# Patient Record
Sex: MALE | Race: BLACK OR AFRICAN AMERICAN | NOT HISPANIC OR LATINO | URBAN - METROPOLITAN AREA
[De-identification: names, ages, dates, MRNs, and addresses within clinical notes are randomized per-mention and may not be internally consistent; named-entity substitution may affect disease eponyms.]

---

## 2020-06-18 ENCOUNTER — OUTPATIENT (OUTPATIENT)
Dept: OUTPATIENT SERVICES | Facility: HOSPITAL | Age: 70
LOS: 1 days | Discharge: HOME | End: 2020-06-18

## 2020-06-22 DIAGNOSIS — F11.20 OPIOID DEPENDENCE, UNCOMPLICATED: ICD-10-CM

## 2020-11-13 VITALS
HEART RATE: 74 BPM | DIASTOLIC BLOOD PRESSURE: 64 MMHG | OXYGEN SATURATION: 98 % | WEIGHT: 162.04 LBS | HEIGHT: 68 IN | TEMPERATURE: 98 F | RESPIRATION RATE: 16 BRPM | SYSTOLIC BLOOD PRESSURE: 135 MMHG

## 2020-11-13 NOTE — H&P ADULT - NSICDXPASTMEDICALHX_GEN_ALL_CORE_FT
PAST MEDICAL HISTORY:  Hypertension      PAST MEDICAL HISTORY:  BPH (benign prostatic hyperplasia)     Depression     H/O gastroesophageal reflux (GERD)     Hypertension     Prediabetes

## 2020-11-13 NOTE — H&P ADULT - NSHPLABSRESULTS_GEN_ALL_CORE
LABS:                       13.4   8.04  )-----------( 294      ( 17 Nov 2020 08:55 )             41.2       11-17    136  |  98  |  17  ----------------------------<  114<H>  4.2   |  26  |  0.75    Ca    9.5      17 Nov 2020 08:55    TPro  7.4  /  Alb  4.4  /  TBili  0.3  /  DBili  x   /  AST  24  /  ALT  17  /  AlkPhos  103  11-17      PT/INR - ( 17 Nov 2020 08:55 )   PT: 12.6 sec;   INR: 1.05          PTT - ( 17 Nov 2020 08:55 )  PTT:27.4 sec    CARDIAC MARKERS ( 17 Nov 2020 08:55 )  x     / x     / 95 U/L / x     / 1.0 ng/mL      EKG: NSR 70bpm, no acute ischemic changes

## 2020-11-13 NOTE — H&P ADULT - NSICDXFAMILYHX_GEN_ALL_CORE_FT
FAMILY HISTORY:  FH: heart attack, mother in 70s  FH: kidney failure, mother  FH: type 2 diabetes, mother

## 2020-11-13 NOTE — H&P ADULT - HISTORY OF PRESENT ILLNESS
****SKELETON****  COVID PCR ***  Cardiologist: Dr. Tristin Mcneil   Pharmacy:   Escort:     69 y/o male, former heroin user ***, w/ PMHx HTN, COPD (**intubations/hospitalizations), BPH, borderline type 2 DM, GERD, anxiety, hepatitis C (s/p treatment), cervical radiculopathy, chronic low back pain, vitamin D deficiency, and hypomagnesemia who presented to outpatient cardiologist, Dr. Tristin Mcneil, c/o *****. Patient reports ***. Patient denies ****. NST (09/10/2020) showed a small area of anterior and apical wall ischemia, EF 61%.     In light of patient's risk factors, CCS Class *** anginal symptoms, and abnormal NST results, patient is referred for cardiac catheterization w/ possible intervention if clinically indicated.  ****SKELETON****  COVID PCR ***  Cardiologist: Dr. Tristin Mcneil   Pharmacy:   Escort:     69 y/o male, former heroin user ***, w/ PMHx HTN, COPD (**intubations/hospitalizations), BPH, borderline type 2 DM, GERD, anxiety, hepatitis C (s/p treatment), cervical radiculopathy, chronic low back pain, who presented to outpatient cardiologist, Dr. Tristin Mcneil, c/o *****. Patient reports ***. Patient denies ****. NST (09/10/2020) showed a small area of anterior and apical wall ischemia, EF 61%, and stress EKG revealed new TWI V4 and V5.   In light of patient's risk factors, CCS Class *** anginal symptoms, and abnormal NST results, patient is referred for cardiac catheterization w/ possible intervention if clinically indicated.  COVID PCR negative 11/15  Cardiologist: Dr. 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 Tristin Mcneil, c/o worsening SOB with exertion. Pt states over the past few months, he has noticed feeling SOB/winded after walking up a flight of stairs. He denies CP, dizziness, palpitations, LOC, orthopnea/PND, leg swelling. No recent illness, fever, cough, or sick contacts. He subsequently underwent pharm NST (09/10/2020) revealing a small area of anterior and apical wall ischemia, EF 61%, and stress EKG revealed new TWI V4 and V5.     In light of patient's risk factors, CCS Class III anginal equivalent symptoms, and abnormal NST, patient is referred for cardiac catheterization w/ possible intervention if clinically indicated.

## 2020-11-13 NOTE — H&P ADULT - NSHPSOCIALHISTORY_GEN_ALL_CORE
-former smoker (1PPD x30 years, quit 20 years ago), former heroin user (quit 20 years ago), no current ETOH or illicit drugs  -lives with wife

## 2020-11-13 NOTE — H&P ADULT - ASSESSMENT
69 y/o male, former 30py smoker and former heroin user (20 yrs ago, on methadone), FHx MI (mother, 70s) with PMHx HTN, prediabetes, COPD (no intubations/hospitalizations), BPH, GERD, depression/anxiety, hepatitis C (s/p treatment), cervical radiculopathy, chronic low back pain, hypomagnesemia, who presents for cardiac catheterization in light of patient's risk factors, CCS Class III anginal equivalent symptoms, and abnormal NST    -ASA III, Mallampati III  -suitable candidate for moderate sedation  -pt reports compliance with aspirin 81mg daily, last dose taken this AM (11/17). Load plavix 600mg   -NS 75cc/hr  -cath consent obtained    Risks & benefits of procedure and alternative therapy have been explained to the patient including but not limited to: allergic reaction, bleeding w/possible need for blood transfusion, infection, renal and vascular compromise, limb damage, arrhythmia, stroke, vessel dissection/perforation, Myocardial infarction, emergent CABG.

## 2020-11-15 ENCOUNTER — APPOINTMENT (OUTPATIENT)
Dept: DISASTER EMERGENCY | Facility: CLINIC | Age: 70
End: 2020-11-15

## 2020-11-15 ENCOUNTER — LABORATORY RESULT (OUTPATIENT)
Age: 70
End: 2020-11-15

## 2020-11-17 ENCOUNTER — OUTPATIENT (OUTPATIENT)
Dept: OUTPATIENT SERVICES | Facility: HOSPITAL | Age: 70
LOS: 1 days | Discharge: NURSING, REHAB OR LONG-TERM CARE | End: 2020-11-17
Payer: COMMERCIAL

## 2020-11-17 LAB
ALBUMIN SERPL ELPH-MCNC: 4.4 G/DL — SIGNIFICANT CHANGE UP (ref 3.3–5)
ALP SERPL-CCNC: 103 U/L — SIGNIFICANT CHANGE UP (ref 40–120)
ALT FLD-CCNC: 17 U/L — SIGNIFICANT CHANGE UP (ref 10–45)
ANION GAP SERPL CALC-SCNC: 12 MMOL/L — SIGNIFICANT CHANGE UP (ref 5–17)
APTT BLD: 27.4 SEC — LOW (ref 27.5–35.5)
AST SERPL-CCNC: 24 U/L — SIGNIFICANT CHANGE UP (ref 10–40)
BASOPHILS # BLD AUTO: 0.06 K/UL — SIGNIFICANT CHANGE UP (ref 0–0.2)
BASOPHILS NFR BLD AUTO: 0.7 % — SIGNIFICANT CHANGE UP (ref 0–2)
BILIRUB SERPL-MCNC: 0.3 MG/DL — SIGNIFICANT CHANGE UP (ref 0.2–1.2)
BUN SERPL-MCNC: 17 MG/DL — SIGNIFICANT CHANGE UP (ref 7–23)
CALCIUM SERPL-MCNC: 9.5 MG/DL — SIGNIFICANT CHANGE UP (ref 8.4–10.5)
CHLORIDE SERPL-SCNC: 98 MMOL/L — SIGNIFICANT CHANGE UP (ref 96–108)
CHOLEST SERPL-MCNC: 153 MG/DL — SIGNIFICANT CHANGE UP
CK MB CFR SERPL CALC: 1 NG/ML — SIGNIFICANT CHANGE UP (ref 0–6.7)
CK SERPL-CCNC: 95 U/L — SIGNIFICANT CHANGE UP (ref 30–200)
CO2 SERPL-SCNC: 26 MMOL/L — SIGNIFICANT CHANGE UP (ref 22–31)
CREAT SERPL-MCNC: 0.75 MG/DL — SIGNIFICANT CHANGE UP (ref 0.5–1.3)
EOSINOPHIL # BLD AUTO: 0.08 K/UL — SIGNIFICANT CHANGE UP (ref 0–0.5)
EOSINOPHIL NFR BLD AUTO: 1 % — SIGNIFICANT CHANGE UP (ref 0–6)
GLUCOSE SERPL-MCNC: 114 MG/DL — HIGH (ref 70–99)
HCT VFR BLD CALC: 41.2 % — SIGNIFICANT CHANGE UP (ref 39–50)
HCV AB S/CO SERPL IA: 11.1 S/CO — SIGNIFICANT CHANGE UP
HCV AB SERPL-IMP: REACTIVE
HDLC SERPL-MCNC: 95 MG/DL — SIGNIFICANT CHANGE UP
HGB BLD-MCNC: 13.4 G/DL — SIGNIFICANT CHANGE UP (ref 13–17)
IMM GRANULOCYTES NFR BLD AUTO: 0.2 % — SIGNIFICANT CHANGE UP (ref 0–1.5)
INR BLD: 1.05 — SIGNIFICANT CHANGE UP (ref 0.88–1.16)
LIPID PNL WITH DIRECT LDL SERPL: 49 MG/DL — SIGNIFICANT CHANGE UP
LYMPHOCYTES # BLD AUTO: 1.81 K/UL — SIGNIFICANT CHANGE UP (ref 1–3.3)
LYMPHOCYTES # BLD AUTO: 22.5 % — SIGNIFICANT CHANGE UP (ref 13–44)
MAGNESIUM SERPL-MCNC: 2.3 MG/DL — SIGNIFICANT CHANGE UP (ref 1.6–2.6)
MCHC RBC-ENTMCNC: 27 PG — SIGNIFICANT CHANGE UP (ref 27–34)
MCHC RBC-ENTMCNC: 32.5 GM/DL — SIGNIFICANT CHANGE UP (ref 32–36)
MCV RBC AUTO: 83.1 FL — SIGNIFICANT CHANGE UP (ref 80–100)
MONOCYTES # BLD AUTO: 0.69 K/UL — SIGNIFICANT CHANGE UP (ref 0–0.9)
MONOCYTES NFR BLD AUTO: 8.6 % — SIGNIFICANT CHANGE UP (ref 2–14)
NEUTROPHILS # BLD AUTO: 5.38 K/UL — SIGNIFICANT CHANGE UP (ref 1.8–7.4)
NEUTROPHILS NFR BLD AUTO: 67 % — SIGNIFICANT CHANGE UP (ref 43–77)
NON HDL CHOLESTEROL: 58 MG/DL — SIGNIFICANT CHANGE UP
NRBC # BLD: 0 /100 WBCS — SIGNIFICANT CHANGE UP (ref 0–0)
PLATELET # BLD AUTO: 294 K/UL — SIGNIFICANT CHANGE UP (ref 150–400)
POTASSIUM SERPL-MCNC: 4.2 MMOL/L — SIGNIFICANT CHANGE UP (ref 3.5–5.3)
POTASSIUM SERPL-SCNC: 4.2 MMOL/L — SIGNIFICANT CHANGE UP (ref 3.5–5.3)
PROT SERPL-MCNC: 7.4 G/DL — SIGNIFICANT CHANGE UP (ref 6–8.3)
PROTHROM AB SERPL-ACNC: 12.6 SEC — SIGNIFICANT CHANGE UP (ref 10.6–13.6)
RBC # BLD: 4.96 M/UL — SIGNIFICANT CHANGE UP (ref 4.2–5.8)
RBC # FLD: 13.9 % — SIGNIFICANT CHANGE UP (ref 10.3–14.5)
SODIUM SERPL-SCNC: 136 MMOL/L — SIGNIFICANT CHANGE UP (ref 135–145)
TRIGL SERPL-MCNC: 44 MG/DL — SIGNIFICANT CHANGE UP
WBC # BLD: 8.04 K/UL — SIGNIFICANT CHANGE UP (ref 3.8–10.5)
WBC # FLD AUTO: 8.04 K/UL — SIGNIFICANT CHANGE UP (ref 3.8–10.5)

## 2020-11-17 PROCEDURE — 85610 PROTHROMBIN TIME: CPT

## 2020-11-17 PROCEDURE — 82550 ASSAY OF CK (CPK): CPT

## 2020-11-17 PROCEDURE — C1887: CPT

## 2020-11-17 PROCEDURE — C1894: CPT

## 2020-11-17 PROCEDURE — 82553 CREATINE MB FRACTION: CPT

## 2020-11-17 PROCEDURE — 93458 L HRT ARTERY/VENTRICLE ANGIO: CPT | Mod: 26

## 2020-11-17 PROCEDURE — 80061 LIPID PANEL: CPT

## 2020-11-17 PROCEDURE — 93458 L HRT ARTERY/VENTRICLE ANGIO: CPT

## 2020-11-17 PROCEDURE — 87521 HEPATITIS C PROBE&RVRS TRNSC: CPT

## 2020-11-17 PROCEDURE — 85730 THROMBOPLASTIN TIME PARTIAL: CPT

## 2020-11-17 PROCEDURE — 36415 COLL VENOUS BLD VENIPUNCTURE: CPT

## 2020-11-17 PROCEDURE — C1769: CPT

## 2020-11-17 PROCEDURE — 83735 ASSAY OF MAGNESIUM: CPT

## 2020-11-17 PROCEDURE — 85025 COMPLETE CBC W/AUTO DIFF WBC: CPT

## 2020-11-17 PROCEDURE — 80053 COMPREHEN METABOLIC PANEL: CPT

## 2020-11-17 PROCEDURE — 86803 HEPATITIS C AB TEST: CPT

## 2020-11-17 RX ORDER — PANTOPRAZOLE SODIUM 20 MG/1
1 TABLET, DELAYED RELEASE ORAL
Qty: 0 | Refills: 0 | DISCHARGE

## 2020-11-17 RX ORDER — ALBUTEROL 90 UG/1
2 AEROSOL, METERED ORAL
Qty: 0 | Refills: 0 | DISCHARGE

## 2020-11-17 RX ORDER — FINASTERIDE 5 MG/1
1 TABLET, FILM COATED ORAL
Qty: 0 | Refills: 0 | DISCHARGE

## 2020-11-17 RX ORDER — LUBIPROSTONE 24 UG/1
1 CAPSULE, GELATIN COATED ORAL
Qty: 0 | Refills: 0 | DISCHARGE

## 2020-11-17 RX ORDER — SODIUM CHLORIDE 9 MG/ML
500 INJECTION INTRAMUSCULAR; INTRAVENOUS; SUBCUTANEOUS
Refills: 0 | Status: DISCONTINUED | OUTPATIENT
Start: 2020-11-17 | End: 2020-11-17

## 2020-11-17 RX ORDER — CHLORHEXIDINE GLUCONATE 213 G/1000ML
1 SOLUTION TOPICAL ONCE
Refills: 0 | Status: DISCONTINUED | OUTPATIENT
Start: 2020-11-17 | End: 2020-11-17

## 2020-11-17 RX ORDER — ARIPIPRAZOLE 15 MG/1
1 TABLET ORAL
Qty: 0 | Refills: 0 | DISCHARGE

## 2020-11-17 RX ORDER — TAMSULOSIN HYDROCHLORIDE 0.4 MG/1
2 CAPSULE ORAL
Qty: 0 | Refills: 0 | DISCHARGE

## 2020-11-17 RX ORDER — RAMIPRIL 5 MG
1 CAPSULE ORAL
Qty: 0 | Refills: 0 | DISCHARGE

## 2020-11-17 RX ORDER — MAGNESIUM OXIDE 400 MG ORAL TABLET 241.3 MG
1 TABLET ORAL
Qty: 0 | Refills: 0 | DISCHARGE

## 2020-11-17 RX ORDER — DONEPEZIL HYDROCHLORIDE 10 MG/1
1 TABLET, FILM COATED ORAL
Qty: 0 | Refills: 0 | DISCHARGE

## 2020-11-17 RX ORDER — QUETIAPINE FUMARATE 200 MG/1
1 TABLET, FILM COATED ORAL
Qty: 0 | Refills: 0 | DISCHARGE

## 2020-11-17 RX ORDER — CLOPIDOGREL BISULFATE 75 MG/1
600 TABLET, FILM COATED ORAL ONCE
Refills: 0 | Status: COMPLETED | OUTPATIENT
Start: 2020-11-17 | End: 2020-11-17

## 2020-11-17 RX ORDER — AMLODIPINE BESYLATE 2.5 MG/1
1 TABLET ORAL
Qty: 0 | Refills: 0 | DISCHARGE

## 2020-11-17 RX ORDER — BUDESONIDE AND FORMOTEROL FUMARATE DIHYDRATE 160; 4.5 UG/1; UG/1
2 AEROSOL RESPIRATORY (INHALATION)
Qty: 0 | Refills: 0 | DISCHARGE

## 2020-11-17 RX ORDER — ASPIRIN/CALCIUM CARB/MAGNESIUM 324 MG
1 TABLET ORAL
Qty: 0 | Refills: 0 | DISCHARGE

## 2020-11-17 RX ADMIN — CLOPIDOGREL BISULFATE 600 MILLIGRAM(S): 75 TABLET, FILM COATED ORAL at 09:49

## 2020-11-17 RX ADMIN — SODIUM CHLORIDE 75 MILLILITER(S): 9 INJECTION INTRAMUSCULAR; INTRAVENOUS; SUBCUTANEOUS at 09:50

## 2020-11-17 NOTE — CHART NOTE - NSCHARTNOTEFT_GEN_A_CORE
Coronary Artery Disease  Coronary Angiogram  LMCA: Normal  LAD: Mild luminal irregularities. Mid myocardial bridge  LCx: Mild luminal irregularities  RCA: Large, dominant vessel with mild luminal irregularities    Left Heart Catheterization  LV Gram: EF 65%  LVEDP 8 mmHg  No AS, No MR    No intervention.    Radial band placed on right radial access site with adequate hemostasis prior to leaving the cath lab. Go-Low study.  No procedural complications    Recommendations:  Medical management  Discharge home  Follow up with outpatient cardiologist, Dr. Mcneil

## 2020-11-17 NOTE — PROGRESS NOTE ADULT - SUBJECTIVE AND OBJECTIVE BOX
Interventional Cardiology PA SDA Discharge Note    Patient without complaints. Ambulated and voided without difficulties  Afebrile, VSS  Ext: Right Radial : no hematoma, no bleeding, dressing C/D/I  Pulses: intact RAD to baseline     A/P:                  1.	Stable for discharge as per attending  _________ after bed rest, pt voids, groin/wrist stable and 30 minutes of ambulation.  2.	Follow-up with PMD/Cardiologist ___________ in 1-2 weeks  3.	Discharged forms signed and copies in chart    Interventional Cardiology PA SDA Discharge Note    Patient without complaints. Ambulated and voided without difficulties  Afebrile, VSS  Ext: Right Radial : no hematoma, no bleeding, dressing C/D/I  Pulses: intact RAD to baseline     A/P:      69 y/o male, former 30py smoker and former heroin user (20 yrs ago, on methadone), FHx MI (mother, 70s) with PMHx HTN, prediabetes, COPD (no intubations/hospitalizations), BPH, GERD, depression/anxiety, hepatitis C (s/p treatment), cervical radiculopathy, chronic low back pain, hypomagnesemia, who presented to outpatient cardiologist, Dr. Tristin Mcneil, c/o worsening SOB with exertion. Pt states over the past few months, he has noticed feeling SOB/winded after walking up a flight of stairs. He denies CP, dizziness, palpitations, LOC, orthopnea/PND, leg swelling. No recent illness, fever, cough, or sick contacts. He subsequently underwent pharm NST (09/10/2020) revealing a small area of anterior and apical wall ischemia, EF 61%, and stress EKG revealed new TWI V4 and V5.     In light of patient's risk factors, CCS Class III anginal equivalent symptoms, and abnormal NST, patient is referred for cardiac catheterization w/ possible intervention if clinically indicated.    Pt is now s/p diagnostic cardiac catheterization (11/17/2020) w/ non-obstructive CAD and no intervention needed (full report below).     Coronary Angiogram:  - LMCA: Normal  - LAD: Mild luminal irregularities. Mid myocardial bridge  - LCx: Mild luminal irregularities  - RCA: Large, dominant vessel with mild luminal irregularities    Left Heart Catheterization  -LV Gram: EF 65%  -LVEDP 8 mmHg  -No AS, No MR      Pt's vitals stable, access site stable with successful hemostasis, pt able to ambulate, and pt able to urinate. Patient deemed stable for discharge home.       1. Stable for discharge as per attending Dr. Carlo Mcneil after bed rest, pt voids, groin/wrist stable and 30 minutes of ambulation.  2. Follow-up with Cardiologist Dr. Tristin Mcneil in 1-2 weeks  3. Discharged forms signed and copies in chart

## 2020-11-21 LAB — HCV RNA FLD QL NAA+PROBE: SIGNIFICANT CHANGE UP

## 2020-12-15 PROBLEM — Z00.00 ENCOUNTER FOR PREVENTIVE HEALTH EXAMINATION: Status: ACTIVE | Noted: 2020-12-15

## 2020-12-30 ENCOUNTER — INPATIENT (INPATIENT)
Facility: HOSPITAL | Age: 70
LOS: 6 days | Discharge: HOME HEALTH | End: 2021-01-06
Attending: INTERNAL MEDICINE | Admitting: INTERNAL MEDICINE
Payer: MEDICARE

## 2020-12-30 VITALS
SYSTOLIC BLOOD PRESSURE: 127 MMHG | WEIGHT: 160.06 LBS | OXYGEN SATURATION: 98 % | DIASTOLIC BLOOD PRESSURE: 69 MMHG | TEMPERATURE: 99 F | RESPIRATION RATE: 16 BRPM | HEART RATE: 88 BPM | HEIGHT: 68 IN

## 2020-12-30 DIAGNOSIS — I10 ESSENTIAL (PRIMARY) HYPERTENSION: ICD-10-CM

## 2020-12-30 DIAGNOSIS — E87.1 HYPO-OSMOLALITY AND HYPONATREMIA: ICD-10-CM

## 2020-12-30 DIAGNOSIS — U07.1 COVID-19: ICD-10-CM

## 2020-12-30 DIAGNOSIS — Z79.51 LONG TERM (CURRENT) USE OF INHALED STEROIDS: ICD-10-CM

## 2020-12-30 DIAGNOSIS — J12.82 PNEUMONIA DUE TO CORONAVIRUS DISEASE 2019: ICD-10-CM

## 2020-12-30 DIAGNOSIS — F11.10 OPIOID ABUSE, UNCOMPLICATED: ICD-10-CM

## 2020-12-30 DIAGNOSIS — J18.9 PNEUMONIA, UNSPECIFIED ORGANISM: ICD-10-CM

## 2020-12-30 DIAGNOSIS — N40.0 BENIGN PROSTATIC HYPERPLASIA WITHOUT LOWER URINARY TRACT SYMPTOMS: ICD-10-CM

## 2020-12-30 DIAGNOSIS — Z79.82 LONG TERM (CURRENT) USE OF ASPIRIN: ICD-10-CM

## 2020-12-30 DIAGNOSIS — I25.10 ATHEROSCLEROTIC HEART DISEASE OF NATIVE CORONARY ARTERY WITHOUT ANGINA PECTORIS: ICD-10-CM

## 2020-12-30 DIAGNOSIS — J44.0 CHRONIC OBSTRUCTIVE PULMONARY DISEASE WITH (ACUTE) LOWER RESPIRATORY INFECTION: ICD-10-CM

## 2020-12-30 DIAGNOSIS — R09.02 HYPOXEMIA: ICD-10-CM

## 2020-12-30 DIAGNOSIS — F41.8 OTHER SPECIFIED ANXIETY DISORDERS: ICD-10-CM

## 2020-12-30 DIAGNOSIS — R73.03 PREDIABETES: ICD-10-CM

## 2020-12-30 DIAGNOSIS — Z87.891 PERSONAL HISTORY OF NICOTINE DEPENDENCE: ICD-10-CM

## 2020-12-30 DIAGNOSIS — K21.9 GASTRO-ESOPHAGEAL REFLUX DISEASE WITHOUT ESOPHAGITIS: ICD-10-CM

## 2020-12-30 PROBLEM — F32.9 MAJOR DEPRESSIVE DISORDER, SINGLE EPISODE, UNSPECIFIED: Chronic | Status: ACTIVE | Noted: 2020-11-17

## 2020-12-30 PROBLEM — Z87.19 PERSONAL HISTORY OF OTHER DISEASES OF THE DIGESTIVE SYSTEM: Chronic | Status: ACTIVE | Noted: 2020-11-17

## 2020-12-30 LAB
ALBUMIN SERPL ELPH-MCNC: 3.6 G/DL — SIGNIFICANT CHANGE UP (ref 3.3–5)
ALP SERPL-CCNC: 95 U/L — SIGNIFICANT CHANGE UP (ref 40–120)
ALT FLD-CCNC: 34 U/L — SIGNIFICANT CHANGE UP (ref 12–78)
ANION GAP SERPL CALC-SCNC: 9 MMOL/L — SIGNIFICANT CHANGE UP (ref 5–17)
APPEARANCE UR: CLEAR — SIGNIFICANT CHANGE UP
AST SERPL-CCNC: 93 U/L — HIGH (ref 15–37)
BACTERIA # UR AUTO: ABNORMAL
BASE EXCESS BLDA CALC-SCNC: 2.5 MMOL/L — HIGH (ref -2–2)
BASOPHILS # BLD AUTO: 0.02 K/UL — SIGNIFICANT CHANGE UP (ref 0–0.2)
BASOPHILS NFR BLD AUTO: 0.3 % — SIGNIFICANT CHANGE UP (ref 0–2)
BILIRUB SERPL-MCNC: 0.4 MG/DL — SIGNIFICANT CHANGE UP (ref 0.2–1.2)
BILIRUB UR-MCNC: NEGATIVE — SIGNIFICANT CHANGE UP
BLOOD GAS COMMENTS: SIGNIFICANT CHANGE UP
BLOOD GAS COMMENTS: SIGNIFICANT CHANGE UP
BLOOD GAS SOURCE: SIGNIFICANT CHANGE UP
BUN SERPL-MCNC: 14 MG/DL — SIGNIFICANT CHANGE UP (ref 7–23)
CALCIUM SERPL-MCNC: 8.3 MG/DL — LOW (ref 8.5–10.1)
CHLORIDE SERPL-SCNC: 97 MMOL/L — SIGNIFICANT CHANGE UP (ref 96–108)
CO2 SERPL-SCNC: 27 MMOL/L — SIGNIFICANT CHANGE UP (ref 22–31)
COLOR SPEC: YELLOW — SIGNIFICANT CHANGE UP
CREAT SERPL-MCNC: 0.95 MG/DL — SIGNIFICANT CHANGE UP (ref 0.5–1.3)
DIFF PNL FLD: ABNORMAL
EOSINOPHIL # BLD AUTO: 0 K/UL — SIGNIFICANT CHANGE UP (ref 0–0.5)
EOSINOPHIL NFR BLD AUTO: 0 % — SIGNIFICANT CHANGE UP (ref 0–6)
EPI CELLS # UR: SIGNIFICANT CHANGE UP
GLUCOSE BLDC GLUCOMTR-MCNC: 130 MG/DL — HIGH (ref 70–99)
GLUCOSE SERPL-MCNC: 132 MG/DL — HIGH (ref 70–99)
GLUCOSE UR QL: NEGATIVE MG/DL — SIGNIFICANT CHANGE UP
HCO3 BLDA-SCNC: 26 MMOL/L — SIGNIFICANT CHANGE UP (ref 21–29)
HCT VFR BLD CALC: 41.1 % — SIGNIFICANT CHANGE UP (ref 39–50)
HGB BLD-MCNC: 13.5 G/DL — SIGNIFICANT CHANGE UP (ref 13–17)
HOROWITZ INDEX BLDA+IHG-RTO: 45 — SIGNIFICANT CHANGE UP
IMM GRANULOCYTES NFR BLD AUTO: 0.3 % — SIGNIFICANT CHANGE UP (ref 0–1.5)
KETONES UR-MCNC: NEGATIVE — SIGNIFICANT CHANGE UP
LEUKOCYTE ESTERASE UR-ACNC: NEGATIVE — SIGNIFICANT CHANGE UP
LYMPHOCYTES # BLD AUTO: 1.6 K/UL — SIGNIFICANT CHANGE UP (ref 1–3.3)
LYMPHOCYTES # BLD AUTO: 22.5 % — SIGNIFICANT CHANGE UP (ref 13–44)
MAGNESIUM SERPL-MCNC: 2.1 MG/DL — SIGNIFICANT CHANGE UP (ref 1.6–2.6)
MCHC RBC-ENTMCNC: 26.6 PG — LOW (ref 27–34)
MCHC RBC-ENTMCNC: 32.8 GM/DL — SIGNIFICANT CHANGE UP (ref 32–36)
MCV RBC AUTO: 80.9 FL — SIGNIFICANT CHANGE UP (ref 80–100)
MONOCYTES # BLD AUTO: 0.27 K/UL — SIGNIFICANT CHANGE UP (ref 0–0.9)
MONOCYTES NFR BLD AUTO: 3.8 % — SIGNIFICANT CHANGE UP (ref 2–14)
NEUTROPHILS # BLD AUTO: 5.2 K/UL — SIGNIFICANT CHANGE UP (ref 1.8–7.4)
NEUTROPHILS NFR BLD AUTO: 73.1 % — SIGNIFICANT CHANGE UP (ref 43–77)
NITRITE UR-MCNC: NEGATIVE — SIGNIFICANT CHANGE UP
NRBC # BLD: 0 /100 WBCS — SIGNIFICANT CHANGE UP (ref 0–0)
NT-PROBNP SERPL-SCNC: 213 PG/ML — HIGH (ref 0–125)
PCO2 BLDA: 37 MMHG — SIGNIFICANT CHANGE UP (ref 32–46)
PH BLD: 7.46 — HIGH (ref 7.35–7.45)
PH UR: 5 — SIGNIFICANT CHANGE UP (ref 5–8)
PHOSPHATE SERPL-MCNC: 3.6 MG/DL — SIGNIFICANT CHANGE UP (ref 2.5–4.5)
PLATELET # BLD AUTO: 248 K/UL — SIGNIFICANT CHANGE UP (ref 150–400)
PO2 BLDA: 65 MMHG — LOW (ref 74–108)
POTASSIUM SERPL-MCNC: 3.7 MMOL/L — SIGNIFICANT CHANGE UP (ref 3.5–5.3)
POTASSIUM SERPL-SCNC: 3.7 MMOL/L — SIGNIFICANT CHANGE UP (ref 3.5–5.3)
PROT SERPL-MCNC: 8.1 GM/DL — SIGNIFICANT CHANGE UP (ref 6–8.3)
PROT UR-MCNC: 15 MG/DL
RBC # BLD: 5.08 M/UL — SIGNIFICANT CHANGE UP (ref 4.2–5.8)
RBC # FLD: 13.5 % — SIGNIFICANT CHANGE UP (ref 10.3–14.5)
RBC CASTS # UR COMP ASSIST: SIGNIFICANT CHANGE UP /HPF (ref 0–4)
SAO2 % BLDA: 92 % — SIGNIFICANT CHANGE UP (ref 92–96)
SODIUM SERPL-SCNC: 133 MMOL/L — LOW (ref 135–145)
SP GR SPEC: 1.01 — SIGNIFICANT CHANGE UP (ref 1.01–1.02)
TROPONIN I SERPL-MCNC: <.015 NG/ML — SIGNIFICANT CHANGE UP (ref 0.01–0.04)
TROPONIN I SERPL-MCNC: <.015 NG/ML — SIGNIFICANT CHANGE UP (ref 0.01–0.04)
UROBILINOGEN FLD QL: 1 MG/DL
WBC # BLD: 7.11 K/UL — SIGNIFICANT CHANGE UP (ref 3.8–10.5)
WBC # FLD AUTO: 7.11 K/UL — SIGNIFICANT CHANGE UP (ref 3.8–10.5)

## 2020-12-30 PROCEDURE — 93010 ELECTROCARDIOGRAM REPORT: CPT

## 2020-12-30 PROCEDURE — 99285 EMERGENCY DEPT VISIT HI MDM: CPT

## 2020-12-30 PROCEDURE — 71045 X-RAY EXAM CHEST 1 VIEW: CPT | Mod: 26

## 2020-12-30 PROCEDURE — 71275 CT ANGIOGRAPHY CHEST: CPT | Mod: 26,MA

## 2020-12-30 RX ORDER — SODIUM CHLORIDE 9 MG/ML
500 INJECTION INTRAMUSCULAR; INTRAVENOUS; SUBCUTANEOUS ONCE
Refills: 0 | Status: COMPLETED | OUTPATIENT
Start: 2020-12-30 | End: 2020-12-30

## 2020-12-30 RX ORDER — AZITHROMYCIN 500 MG/1
500 TABLET, FILM COATED ORAL ONCE
Refills: 0 | Status: COMPLETED | OUTPATIENT
Start: 2020-12-30 | End: 2020-12-30

## 2020-12-30 RX ORDER — ONDANSETRON 8 MG/1
4 TABLET, FILM COATED ORAL ONCE
Refills: 0 | Status: COMPLETED | OUTPATIENT
Start: 2020-12-30 | End: 2020-12-30

## 2020-12-30 RX ORDER — CEFTRIAXONE 500 MG/1
1000 INJECTION, POWDER, FOR SOLUTION INTRAMUSCULAR; INTRAVENOUS ONCE
Refills: 0 | Status: COMPLETED | OUTPATIENT
Start: 2020-12-30 | End: 2020-12-30

## 2020-12-30 RX ADMIN — ONDANSETRON 4 MILLIGRAM(S): 8 TABLET, FILM COATED ORAL at 16:54

## 2020-12-30 RX ADMIN — AZITHROMYCIN 255 MILLIGRAM(S): 500 TABLET, FILM COATED ORAL at 22:23

## 2020-12-30 RX ADMIN — SODIUM CHLORIDE 500 MILLILITER(S): 9 INJECTION INTRAMUSCULAR; INTRAVENOUS; SUBCUTANEOUS at 15:40

## 2020-12-30 NOTE — ED PROVIDER NOTE - PATIENT PORTAL LINK FT
You can access the FollowMyHealth Patient Portal offered by [facility] by registering at the following website: [email or web address]. By joining [employer]’s FollowMyHealth portal, you will also be able to view your health information using other applications (apps) compatible with our system.

## 2020-12-30 NOTE — ED PROVIDER NOTE - PMH
BPH (benign prostatic hyperplasia)    Depression    H/O gastroesophageal reflux (GERD)    Hypertension    Prediabetes

## 2020-12-30 NOTE — ED PROVIDER NOTE - NSFOLLOWUPCLINICS_GEN_ALL_ED_FT
[facility]  Psychiatry  [id number] [place] [address]  Phone: [phone]  Fax:   Follow Up Time:

## 2020-12-30 NOTE — ED PROVIDER NOTE - PROGRESS NOTE DETAILS
Phuc: tolerating PO, trop x 2 negative, dc with psych follow up. Patient poor historian, was treated by Dr. Friend as above, normal labs and vitals in ER, however post discharge patient hypoxic to low 80's at rest.  Patient denies dyspnea, no wheezing on exam, COPD at home, denies use of oxygen.  Patient's discharge undone, will obtain ABG, PE study, pro BNP, admit. COVID negative.    Gen: Alert, NAD, well appearing  Head: NC, AT, PERRL, EOMI, normal lids/conjunctiva  ENT: normal hearing, patent oropharynx without erythema/exudate, uvula midline  Neck: +supple, no tenderness/meningismus/JVD, +Trachea midline  Pulm: Bilateral BS, slightly increased resp effort, no wheeze/stridor/retractions  CV: tachycardia, no M/R/G, +dist pulses  Abd: soft, NT/ND, Negative [place] signs, +BS, no palpable masses  Mskel: no edema/erythema/cyanosis  Skin: no rash, warm/dry  Neuro: AAOx3, no apparent sensory/motor deficits, coordination intact Patient poor historian, was treated by Dr. Friend as above, normal labs and vitals in ER, however post discharge patient hypoxic to low 80's at rest.  Patient denies dyspnea, no wheezing on exam, COPD at home, denies use of oxygen.  Patient's discharge undone, will obtain ABG, PE study, pro BNP, admit. COVID pending, CXR read + for BL pneumonia, will cover with rocephin/azithro for CAP.     Gen: Alert, NAD, well appearing  Head: NC, AT, PERRL, EOMI, normal lids/conjunctiva  ENT: normal hearing, patent oropharynx without erythema/exudate, uvula midline  Neck: +supple, no tenderness/meningismus/JVD, +Trachea midline  Pulm: Bilateral BS, slightly increased resp effort, no wheeze/stridor/retractions  CV: tachycardia, no M/R/G, +dist pulses  Abd: soft, NT/ND, Negative [place] signs, +BS, no palpable masses  Mskel: no edema/erythema/cyanosis  Skin: no rash, warm/dry  Neuro: AAOx3, no apparent sensory/motor deficits, coordination intact CTA negative for PE.  Patient is to be admitted to the hospital and the case was discussed with the admitting physician.  Any changes in plan, additional imaging/labs, and further work up will be at the discretion of the admitting physician. Per discussion with admitting physician, all necessary consults for admitted patients to be obtained by morning team unless patient requires emergent intervention or medical advice by specialist overnight. Patient poor historian, was treated by Dr. Friend as above, normal labs and vitals in ER, however post discharge patient hypoxic to low 80's at rest.  Patient denies dyspnea, no wheezing on exam, COPD at home, denies use of oxygen.  Patient's discharge undone, will obtain ABG, PE study, pro BNP, admit. Patient not complaining of abdominal pain or nausea at this time.  COVID pending, CXR read + for BL pneumonia, will cover with rocephin/azithro for CAP.     Gen: Alert, NAD, well appearing  Head: NC, AT, PERRL, EOMI, normal lids/conjunctiva  ENT: normal hearing, patent oropharynx without erythema/exudate, uvula midline  Neck: +supple, no tenderness/meningismus/JVD, +Trachea midline  Pulm: Bilateral BS, slightly increased resp effort, no wheeze/stridor/retractions  CV: tachycardia, no M/R/G, +dist pulses  Abd: soft, NT/ND, Negative [place] signs, +BS, no palpable masses  Mskel: no edema/erythema/cyanosis  Skin: no rash, warm/dry  Neuro: AAOx3, no apparent sensory/motor deficits, coordination intact

## 2020-12-30 NOTE — ED PROVIDER NOTE - PHYSICAL EXAMINATION
VITALS: reviewed  GEN: NAD, A & O x 4  HEAD/EYES: NCAT, PERRL, EOMI, anicteric sclerae, no conjunctival pallor  ENT: mucus membranes moist, oropharynx WNL, trachea midline, no JVD  RESP: lungs CTA with equal breath sounds bilaterally, chest wall nontender and atraumatic  CV: heart with reg rhythm S1, S2, no murmur; distal pulses intact and symmetric bilaterally  ABDOMEN: normoactive bowel sounds, soft, nondistended, nontender, no palpable masses  : no CVAT  MSK: extremities atraumatic and nontender, no edema, no asymmetry.   SKIN: warm, dry, no rash, no bruising, no cyanosis. color appropriate for ethnicity  NEURO: alert, mentating appropriately, no facial asymmetry. gross sensation, motor, coordination are intact  PSYCH: Affect appropriate

## 2020-12-30 NOTE — ED ADULT NURSE REASSESSMENT NOTE - NS ED NURSE REASSESS COMMENT FT1
PT discharged canceled , pt hypoxic noted 88 % on room air placed on 4L NC improvement 94% . PT denies chest pain, SOB. placed on cardiac monitor will continue to monitor.

## 2020-12-30 NOTE — ED PROVIDER NOTE - CLINICAL SUMMARY MEDICAL DECISION MAKING FREE TEXT BOX
69 yo M presenting for general medical evaluation/ complaints of depression in setting of his wife being sick. No SI/HI. does not appear to be harm to self. Will obtain baseline labs r/o electrolyte disturbance, UA r/o UTI, dc with psych fu.

## 2020-12-30 NOTE — ED ADULT NURSE NOTE - NSIMPLEMENTINTERV_GEN_ALL_ED
Implemented All Fall with Harm Risk Interventions:  Gravity to call system. Call bell, personal items and telephone within reach. Instruct patient to call for assistance. Room bathroom lighting operational. Non-slip footwear when patient is off stretcher. Physically safe environment: no spills, clutter or unnecessary equipment. Stretcher in lowest position, wheels locked, appropriate side rails in place. Provide visual cue, wrist band, yellow gown, etc. Monitor gait and stability. Monitor for mental status changes and reorient to person, place, and time. Review medications for side effects contributing to fall risk. Reinforce activity limits and safety measures with patient and family. Provide visual clues: red socks.

## 2020-12-30 NOTE — ED PROVIDER NOTE - OBJECTIVE STATEMENT
69 yo M hx heroin use 20 years ago, now on Methadone, HTN, prediabetes, COPD not on home O2, BPH, GERD, depression/anxiety (never on meds), hepatitis s/pt x, chronic low back pain, CAD, presenting with complaints of decreased appetite, feeling sad/depressed and anxious over the past few days because his wife is in the hospital. Denies nausea, no vomiting, no abdominal pain. No constipation/diarrhea. NO black/bloody stools. No SI/HI. No AH/VH. Denies alcohol use or drug use.     Denies chest pain, shortness of breath/PRIEST.     No sick contacts.

## 2020-12-31 DIAGNOSIS — I10 ESSENTIAL (PRIMARY) HYPERTENSION: ICD-10-CM

## 2020-12-31 DIAGNOSIS — Z29.9 ENCOUNTER FOR PROPHYLACTIC MEASURES, UNSPECIFIED: ICD-10-CM

## 2020-12-31 DIAGNOSIS — F11.10 OPIOID ABUSE, UNCOMPLICATED: ICD-10-CM

## 2020-12-31 DIAGNOSIS — N40.0 BENIGN PROSTATIC HYPERPLASIA WITHOUT LOWER URINARY TRACT SYMPTOMS: ICD-10-CM

## 2020-12-31 DIAGNOSIS — J18.9 PNEUMONIA, UNSPECIFIED ORGANISM: ICD-10-CM

## 2020-12-31 LAB
ALBUMIN SERPL ELPH-MCNC: 2.9 G/DL — LOW (ref 3.3–5)
ALP SERPL-CCNC: 72 U/L — SIGNIFICANT CHANGE UP (ref 40–120)
ALT FLD-CCNC: 29 U/L — SIGNIFICANT CHANGE UP (ref 12–78)
AMMONIA BLD-MCNC: 51 UMOL/L — HIGH (ref 11–32)
ANION GAP SERPL CALC-SCNC: 8 MMOL/L — SIGNIFICANT CHANGE UP (ref 5–17)
AST SERPL-CCNC: 77 U/L — HIGH (ref 15–37)
BASOPHILS # BLD AUTO: 0 K/UL — SIGNIFICANT CHANGE UP (ref 0–0.2)
BASOPHILS NFR BLD AUTO: 0 % — SIGNIFICANT CHANGE UP (ref 0–2)
BILIRUB DIRECT SERPL-MCNC: 0.27 MG/DL — HIGH (ref 0.05–0.2)
BILIRUB SERPL-MCNC: 0.7 MG/DL — SIGNIFICANT CHANGE UP (ref 0.2–1.2)
BUN SERPL-MCNC: 11 MG/DL — SIGNIFICANT CHANGE UP (ref 7–23)
BURR CELLS BLD QL SMEAR: PRESENT — SIGNIFICANT CHANGE UP
CALCIUM SERPL-MCNC: 7.9 MG/DL — LOW (ref 8.5–10.1)
CHLORIDE SERPL-SCNC: 97 MMOL/L — SIGNIFICANT CHANGE UP (ref 96–108)
CO2 SERPL-SCNC: 26 MMOL/L — SIGNIFICANT CHANGE UP (ref 22–31)
CREAT SERPL-MCNC: 0.77 MG/DL — SIGNIFICANT CHANGE UP (ref 0.5–1.3)
CULTURE RESULTS: SIGNIFICANT CHANGE UP
ELLIPTOCYTES BLD QL SMEAR: SLIGHT — SIGNIFICANT CHANGE UP
EOSINOPHIL # BLD AUTO: 0 K/UL — SIGNIFICANT CHANGE UP (ref 0–0.5)
EOSINOPHIL NFR BLD AUTO: 0 % — SIGNIFICANT CHANGE UP (ref 0–6)
GLUCOSE SERPL-MCNC: 94 MG/DL — SIGNIFICANT CHANGE UP (ref 70–99)
HCT VFR BLD CALC: 34.5 % — LOW (ref 39–50)
HGB BLD-MCNC: 11.8 G/DL — LOW (ref 13–17)
LYMPHOCYTES # BLD AUTO: 1.44 K/UL — SIGNIFICANT CHANGE UP (ref 1–3.3)
LYMPHOCYTES # BLD AUTO: 10 % — LOW (ref 13–44)
MACROCYTES BLD QL: SLIGHT — SIGNIFICANT CHANGE UP
MAGNESIUM SERPL-MCNC: 2.1 MG/DL — SIGNIFICANT CHANGE UP (ref 1.6–2.6)
MANUAL SMEAR VERIFICATION: SIGNIFICANT CHANGE UP
MCHC RBC-ENTMCNC: 26.8 PG — LOW (ref 27–34)
MCHC RBC-ENTMCNC: 34.2 GM/DL — SIGNIFICANT CHANGE UP (ref 32–36)
MCV RBC AUTO: 78.2 FL — LOW (ref 80–100)
MONOCYTES # BLD AUTO: 0.29 K/UL — SIGNIFICANT CHANGE UP (ref 0–0.9)
MONOCYTES NFR BLD AUTO: 2 % — SIGNIFICANT CHANGE UP (ref 2–14)
NEUTROPHILS # BLD AUTO: 12.63 K/UL — HIGH (ref 1.8–7.4)
NEUTROPHILS NFR BLD AUTO: 82 % — HIGH (ref 43–77)
NEUTS BAND # BLD: 6 % — SIGNIFICANT CHANGE UP (ref 0–8)
NRBC # BLD: 0 /100 — SIGNIFICANT CHANGE UP (ref 0–0)
NRBC # BLD: SIGNIFICANT CHANGE UP /100 WBCS (ref 0–0)
OVALOCYTES BLD QL SMEAR: SLIGHT — SIGNIFICANT CHANGE UP
PHOSPHATE SERPL-MCNC: 3 MG/DL — SIGNIFICANT CHANGE UP (ref 2.5–4.5)
PLAT MORPH BLD: NORMAL — SIGNIFICANT CHANGE UP
PLATELET # BLD AUTO: 220 K/UL — SIGNIFICANT CHANGE UP (ref 150–400)
POTASSIUM SERPL-MCNC: 3.7 MMOL/L — SIGNIFICANT CHANGE UP (ref 3.5–5.3)
POTASSIUM SERPL-SCNC: 3.7 MMOL/L — SIGNIFICANT CHANGE UP (ref 3.5–5.3)
PROT SERPL-MCNC: 7.1 GM/DL — SIGNIFICANT CHANGE UP (ref 6–8.3)
RBC # BLD: 4.41 M/UL — SIGNIFICANT CHANGE UP (ref 4.2–5.8)
RBC # FLD: 13.7 % — SIGNIFICANT CHANGE UP (ref 10.3–14.5)
RBC BLD AUTO: SIGNIFICANT CHANGE UP
SARS-COV-2 RNA SPEC QL NAA+PROBE: DETECTED
SODIUM SERPL-SCNC: 131 MMOL/L — LOW (ref 135–145)
SPECIMEN SOURCE: SIGNIFICANT CHANGE UP
WBC # BLD: 14.35 K/UL — HIGH (ref 3.8–10.5)
WBC # FLD AUTO: 14.35 K/UL — HIGH (ref 3.8–10.5)

## 2020-12-31 PROCEDURE — 99223 1ST HOSP IP/OBS HIGH 75: CPT

## 2020-12-31 PROCEDURE — 99232 SBSQ HOSP IP/OBS MODERATE 35: CPT

## 2020-12-31 RX ORDER — METHADONE HYDROCHLORIDE 40 MG/1
4 TABLET ORAL
Qty: 0 | Refills: 0 | DISCHARGE

## 2020-12-31 RX ORDER — TAMSULOSIN HYDROCHLORIDE 0.4 MG/1
0.4 CAPSULE ORAL AT BEDTIME
Refills: 0 | Status: DISCONTINUED | OUTPATIENT
Start: 2020-12-31 | End: 2020-12-31

## 2020-12-31 RX ORDER — LISINOPRIL 2.5 MG/1
40 TABLET ORAL DAILY
Refills: 0 | Status: DISCONTINUED | OUTPATIENT
Start: 2020-12-31 | End: 2021-01-06

## 2020-12-31 RX ORDER — HYDROCHLOROTHIAZIDE 25 MG
12.5 TABLET ORAL DAILY
Refills: 0 | Status: DISCONTINUED | OUTPATIENT
Start: 2020-12-31 | End: 2021-01-06

## 2020-12-31 RX ORDER — ARIPIPRAZOLE 15 MG/1
5 TABLET ORAL DAILY
Refills: 0 | Status: DISCONTINUED | OUTPATIENT
Start: 2020-12-31 | End: 2021-01-06

## 2020-12-31 RX ORDER — DONEPEZIL HYDROCHLORIDE 10 MG/1
10 TABLET, FILM COATED ORAL AT BEDTIME
Refills: 0 | Status: DISCONTINUED | OUTPATIENT
Start: 2020-12-31 | End: 2020-12-31

## 2020-12-31 RX ORDER — QUETIAPINE FUMARATE 200 MG/1
100 TABLET, FILM COATED ORAL AT BEDTIME
Refills: 0 | Status: DISCONTINUED | OUTPATIENT
Start: 2020-12-31 | End: 2021-01-06

## 2020-12-31 RX ORDER — REMDESIVIR 5 MG/ML
200 INJECTION INTRAVENOUS EVERY 24 HOURS
Refills: 0 | Status: COMPLETED | OUTPATIENT
Start: 2020-12-31 | End: 2021-01-01

## 2020-12-31 RX ORDER — PANTOPRAZOLE SODIUM 20 MG/1
40 TABLET, DELAYED RELEASE ORAL
Refills: 0 | Status: DISCONTINUED | OUTPATIENT
Start: 2020-12-31 | End: 2021-01-06

## 2020-12-31 RX ORDER — ONDANSETRON 8 MG/1
4 TABLET, FILM COATED ORAL EVERY 6 HOURS
Refills: 0 | Status: DISCONTINUED | OUTPATIENT
Start: 2020-12-31 | End: 2020-12-31

## 2020-12-31 RX ORDER — ACETAMINOPHEN 500 MG
650 TABLET ORAL EVERY 6 HOURS
Refills: 0 | Status: DISCONTINUED | OUTPATIENT
Start: 2020-12-31 | End: 2021-01-06

## 2020-12-31 RX ORDER — AMLODIPINE BESYLATE 2.5 MG/1
5 TABLET ORAL DAILY
Refills: 0 | Status: DISCONTINUED | OUTPATIENT
Start: 2020-12-31 | End: 2021-01-06

## 2020-12-31 RX ORDER — ENOXAPARIN SODIUM 100 MG/ML
40 INJECTION SUBCUTANEOUS DAILY
Refills: 0 | Status: DISCONTINUED | OUTPATIENT
Start: 2020-12-31 | End: 2021-01-06

## 2020-12-31 RX ORDER — TAMSULOSIN HYDROCHLORIDE 0.4 MG/1
0.8 CAPSULE ORAL AT BEDTIME
Refills: 0 | Status: DISCONTINUED | OUTPATIENT
Start: 2020-12-31 | End: 2021-01-06

## 2020-12-31 RX ORDER — ALBUTEROL 90 UG/1
2 AEROSOL, METERED ORAL EVERY 6 HOURS
Refills: 0 | Status: DISCONTINUED | OUTPATIENT
Start: 2020-12-31 | End: 2021-01-06

## 2020-12-31 RX ORDER — DEXAMETHASONE 0.5 MG/5ML
6 ELIXIR ORAL DAILY
Refills: 0 | Status: DISCONTINUED | OUTPATIENT
Start: 2020-12-31 | End: 2021-01-06

## 2020-12-31 RX ORDER — METHADONE HYDROCHLORIDE 40 MG/1
40 TABLET ORAL DAILY
Refills: 0 | Status: DISCONTINUED | OUTPATIENT
Start: 2020-12-31 | End: 2021-01-06

## 2020-12-31 RX ORDER — FINASTERIDE 5 MG/1
5 TABLET, FILM COATED ORAL DAILY
Refills: 0 | Status: DISCONTINUED | OUTPATIENT
Start: 2020-12-31 | End: 2021-01-06

## 2020-12-31 RX ORDER — METHADONE HYDROCHLORIDE 40 MG/1
3 TABLET ORAL
Qty: 0 | Refills: 0 | DISCHARGE

## 2020-12-31 RX ORDER — REMDESIVIR 5 MG/ML
INJECTION INTRAVENOUS
Refills: 0 | Status: DISCONTINUED | OUTPATIENT
Start: 2020-12-31 | End: 2021-01-01

## 2020-12-31 RX ORDER — ASPIRIN/CALCIUM CARB/MAGNESIUM 324 MG
81 TABLET ORAL DAILY
Refills: 0 | Status: DISCONTINUED | OUTPATIENT
Start: 2020-12-31 | End: 2021-01-06

## 2020-12-31 RX ORDER — DEXAMETHASONE 0.5 MG/5ML
10 ELIXIR ORAL ONCE
Refills: 0 | Status: COMPLETED | OUTPATIENT
Start: 2020-12-31 | End: 2020-12-31

## 2020-12-31 RX ADMIN — Medication 6 MILLIGRAM(S): at 23:00

## 2020-12-31 RX ADMIN — ENOXAPARIN SODIUM 40 MILLIGRAM(S): 100 INJECTION SUBCUTANEOUS at 12:29

## 2020-12-31 RX ADMIN — QUETIAPINE FUMARATE 100 MILLIGRAM(S): 200 TABLET, FILM COATED ORAL at 22:23

## 2020-12-31 RX ADMIN — TAMSULOSIN HYDROCHLORIDE 0.8 MILLIGRAM(S): 0.4 CAPSULE ORAL at 22:23

## 2020-12-31 RX ADMIN — ALBUTEROL 2 PUFF(S): 90 AEROSOL, METERED ORAL at 04:30

## 2020-12-31 RX ADMIN — ARIPIPRAZOLE 5 MILLIGRAM(S): 15 TABLET ORAL at 12:28

## 2020-12-31 RX ADMIN — CEFTRIAXONE 100 MILLIGRAM(S): 500 INJECTION, POWDER, FOR SOLUTION INTRAMUSCULAR; INTRAVENOUS at 01:11

## 2020-12-31 RX ADMIN — METHADONE HYDROCHLORIDE 40 MILLIGRAM(S): 40 TABLET ORAL at 14:27

## 2020-12-31 RX ADMIN — FINASTERIDE 5 MILLIGRAM(S): 5 TABLET, FILM COATED ORAL at 12:28

## 2020-12-31 RX ADMIN — Medication 81 MILLIGRAM(S): at 12:28

## 2020-12-31 RX ADMIN — Medication 102 MILLIGRAM(S): at 04:29

## 2020-12-31 NOTE — H&P ADULT - HISTORY OF PRESENT ILLNESS
69 y/o male, w/ PMHx of Heroin abuse on methadone), HTN, prediabetes, COPD  BPH, GERD, depression/anxiety, hepatitis C (s/p treatment), cervical radiculopathy, chronic low back pain, s/p Cardiac Cath last month after +ve stress presents to the ED stating "I think my aid called EMS because I was short of breath". Pt AAOX 3, unsure why he is in the hospital, seems dazed. Per triage, EMS called for nausea and constipation. Pt denies SOB, cp, cough, fever or chills. Pt unsure if he's constipated. Pt reports he has been feeling a little fatigue the last few days, offered no other complaints.     In ED BP stable SPO2 88% on RA, HR 88---> 105. Pt afebrile. Labs stable. CTA show Extensive bilateral patchy and linear groundglass opacities with peripheral predominance, suspicious for Covid-19. Pt given Rocephin and Azithro    71 y/o male, w/ PMHx of Heroin abuse on methadone, HTN, prediabetes, COPD  BPH, GERD, depression/anxiety, hepatitis C (s/p treatment), cervical radiculopathy, chronic low back pain, s/p Cardiac Cath last month (showed non-obstructive CAD) after c/o SOB exertion and having a +ve stress test,  presents to the ED stating "I think my aid called EMS because I was short of breath". Pt AAOX 3, unsure why he is in the hospital, seems dazed. Per triage, EMS called for nausea and constipation. Pt denies SOB, cp, cough, fever or chills. Pt unsure if he's constipated. Pt reports he has been feeling a little fatigue the last few days, offered no other complaints.     In ED BP stable SPO2 88% on RA, HR 88---> 105. Pt afebrile. Labs stable. CTA show Extensive bilateral patchy and linear groundglass opacities with peripheral predominance, suspicious for Covid-19. Pt given Rocephin and Azithro

## 2020-12-31 NOTE — CONSULT NOTE ADULT - ASSESSMENT
71 y/o male with PMHx of Heroin abuse on methadone, HTN, prediabetes, COPD  BPH, GERD, depression/anxiety, hepatitis C (s/p treatment), cervical radiculopathy, chronic low back pain, s/p Cardiac Cath last month (showed non-obstructive CAD) after c/o SOB exertion and having a +ve stress test, was brought to the hospital with shortness of breath, found to have O2 SAT 88% on RA, placed on NC 6 L, improved. The pt had one time low grade fever of 100.2, no fevers at home, WBC 14.35, normal renal function, UCx is negative, CTA negative for PE, positive for bilateral ground glass opacities, extensive. AST is elevated, ALT normal.   During examination, the pt is A&O x 3, no distress, on supplemental O2 NC 6 L- reduced to 4 L and doing well so far, RN notified. The pt denies having cough, no lack of smell or taste, no chest pain, no N/V, diarrhea, +constipation. The pt denies any urinary symptoms.   The pt was given one time doses of Rocephin and Zithromax in case he has CAP, was given one time dose of Decadron, COVID 19 testing is pending.   Pt's abd exam is benign, US abd pending.     1. Pneumonia  2. Fever  3. Leukocytosis  4. Hypoxia    Plan  - awaiting for COVID-19 testing   - if negative, start on Rocephin and Zithromax for CAP, send blood cultures, send sputum culture  - if positive, start on Decadron and remdesivir  - supplemental O2 for O2 SAT > 92%  - monitor pt's temperatures  - monitor WBC  - monitor pt's renal function  - monitor hepatic function  - precautions as per protocol  - anticoagulation as per protocol      -      69 y/o male with PMHx of Heroin abuse on methadone, HTN, prediabetes, COPD  BPH, GERD, depression/anxiety, hepatitis C (s/p treatment), cervical radiculopathy, chronic low back pain, s/p Cardiac Cath last month (showed non-obstructive CAD) after c/o SOB exertion and having a +ve stress test, was brought to the hospital with shortness of breath, found to have O2 SAT 88% on RA, placed on NC 6 L, improved. The pt had one time low grade fever of 100.2, no fevers at home, WBC 14.35, normal renal function, UCx is negative, CTA negative for PE, positive for bilateral ground glass opacities, extensive. AST is elevated, ALT normal.   During examination, the pt is A&O x 3, no distress, on supplemental O2 NC 6 L- reduced to 4 L and doing well so far, RN notified. The pt denies having cough, no lack of smell or taste, no chest pain, no N/V, diarrhea, +constipation. The pt denies any urinary symptoms.   The pt was given one time doses of Rocephin and Zithromax in case he has CAP, was given one time dose of Decadron, COVID 19 testing is pending.   Pt's abd exam is benign, US abd pending.     1. Pneumonia  2. Fever  3. Leukocytosis  4. Hypoxia    Plan  - awaiting for COVID-19 testing   - if negative, start on Rocephin and Zithromax for CAP, send blood cultures, send sputum culture  - if positive, start on Decadron and remdesivir  - supplemental O2 for O2 SAT > 92%  - monitor pt's temperatures  - monitor WBC  - monitor pt's renal function  - monitor hepatic function  - precautions as per protocol  - anticoagulation as per protocol  - trend inflammatory markers

## 2020-12-31 NOTE — H&P ADULT - NSHPLABSRESULTS_GEN_ALL_CORE
T(C): 37.1 (20 @ 05:45), Max: 37.9 (20 @ 04:59)  HR: 80 (20 @ 05:45) (79 - 105)  BP: 117/67 (20 @ 05:45) (99/52 - 134/88)  RR: 18 (20 @ 05:45) (16 - 25)  SpO2: 93% (20 @ 05:45) (88% - 98%)                        11.8   14.35 )-----------( 220      ( 31 Dec 2020 04:40 )             34.5         131<L>  |  97  |  11  ----------------------------<  94  3.7   |  26  |  0.77    Ca    7.9<L>      31 Dec 2020 04:40  Phos  3.0       Mg     2.1         TPro  7.1  /  Alb  2.9<L>  /  TBili  0.7  /  DBili  x   /  AST  77<H>  /  ALT  29  /  AlkPhos  72      LIVER FUNCTIONS - ( 31 Dec 2020 04:40 )  Alb: 2.9 g/dL / Pro: 7.1 gm/dL / ALK PHOS: 72 U/L / ALT: 29 U/L / AST: 77 U/L / GGT: x             Urinalysis Basic - ( 30 Dec 2020 14:40 )    Color: Yellow / Appearance: Clear / S.015 / pH: x  Gluc: x / Ketone: Negative  / Bili: Negative / Urobili: 1 mg/dL   Blood: x / Protein: 15 mg/dL / Nitrite: Negative   Leuk Esterase: Negative / RBC: 0-2 /HPF / WBC x   Sq Epi: x / Non Sq Epi: Few / Bacteria: Few      < from: CT Angio Chest w/ IV Cont (20 @ 23:27) >    IMPRESSION:    1. No central or lobar PE. Limited evaluation of segmental and subsegmental branches due to suboptimal opacification and motion artifact.  2. Extensive bilateral patchy and linear groundglass opacities with peripheral predominance, suspicious for Covid-19.  Correlation with PCR testing is recommended.      < end of copied text >        acetaminophen   Tablet .. 650 milliGRAM(s) Oral every 6 hours PRN  ALBUTerol    90 MICROgram(s) HFA Inhaler 2 Puff(s) Inhalation every 6 hours PRN  ondansetron Injectable 4 milliGRAM(s) IV Push every 6 hours PRN

## 2020-12-31 NOTE — H&P ADULT - NSHPPHYSICALEXAM_GEN_ALL_CORE
PHYSICAL EXAM:    Vital Signs Last 24 Hrs  T(C): 37.1 (31 Dec 2020 05:45), Max: 37.9 (31 Dec 2020 04:59)  T(F): 98.7 (31 Dec 2020 05:45), Max: 100.2 (31 Dec 2020 04:59)  HR: 80 (31 Dec 2020 05:45) (79 - 105)  BP: 117/67 (31 Dec 2020 05:45) (99/52 - 134/88)  BP(mean): --  RR: 18 (31 Dec 2020 05:45) (16 - 25)  SpO2: 93% (31 Dec 2020 05:45) (88% - 98%)    GENERAL: Pt lying in bed comfortably in NAD  HEENT:  Atraumatic, EOMI, PERRL, conjunctiva and sclera clear, MMM  NECK: Supple, No JVD  CHEST/LUNG: Clear to auscultation bilaterally; No rales, rhonchi, wheezing or rubs. Unlabored respirations  HEART: Regular rate and rhythm; No murmurs, rubs, or gallops  ABDOMEN: Bowel sounds present; Soft, Nontender, Nondistended. No guarding or rigidity    EXTREMITIES:  2+ Peripheral Pulses, brisk capillary refill. No clubbing, cyanosis, or edema  NEUROLOGICAL:  Alert & Oriented X3, speech clear. Answers questions appropriately. Full and equal strength B/L upper and lower extremities. No deficits   MSK: FROM x 4 extremities   SKIN: No rashes or lesions

## 2020-12-31 NOTE — PHARMACY COMMUNICATION NOTE - REASON FOR NOTE
Medication reconciliation: Ramipirl 10mg BID listed as outpatient medication. NF at the hopsital; converting to lisinopril

## 2020-12-31 NOTE — H&P ADULT - PROBLEM SELECTOR PLAN 1
- CT scan show opacities suspicious for COVID  - COVID pending  - pt given 1 dose of decadron, Rocephin and Azithro  - f/u COVID; cont management according

## 2020-12-31 NOTE — H&P ADULT - NSICDXPASTMEDICALHX_GEN_ALL_CORE_FT
PAST MEDICAL HISTORY:  BPH (benign prostatic hyperplasia)     Depression     H/O gastroesophageal reflux (GERD)     Hypertension     Prediabetes

## 2020-12-31 NOTE — CONSULT NOTE ADULT - SUBJECTIVE AND OBJECTIVE BOX
[facility]  Division of Infectious Diseases  [phone]    MARGY HINOJOSA  70y, Male  [id number]    HPI--        71 y/o male, w/ PMHx of Heroin abuse on methadone, HTN, prediabetes, COPD  BPH, GERD, depression/anxiety, hepatitis C (s/p treatment), cervical radiculopathy, chronic low back pain, s/p Cardiac Cath last month (showed non-obstructive CAD) after c/o SOB exertion and having a +ve stress test,  presents to the ED stating "I think my aid called EMS because I was short of breath". Pt AAOX 3, unsure why he is in the hospital, seems dazed. Per triage, EMS called for nausea and constipation. Pt denies SOB, cp, cough, fever or chills. Pt unsure if he's constipated. Pt reports he has been feeling a little fatigue the last few days, offered no other complaints.     In ED BP stable SPO2 88% on RA, HR 88---> 105. Pt afebrile. Labs stable. CTA show Extensive bilateral patchy and linear groundglass opacities with peripheral predominance, suspicious for Covid-19. Pt given Rocephin and Azithro    ED HPI reviewed  71 y/o male from home, has HHA, pt's wife is currently in the hospital ? [place], had OHS as per pt. The pt with PMHx of Heroin abuse on methadone, HTN, prediabetes, COPD  BPH, GERD, depression/anxiety, hepatitis C (s/p treatment), cervical radiculopathy, chronic low back pain, s/p Cardiac Cath last month (showed non-obstructive CAD) after c/o SOB exertion and having a +ve stress test, HHA called EMS as the pt seemed short of breath. The pt was on RA upon admission, O2 SAT went down to 88% on RA with RR 23, placed on NC 6L and O2 SAT improved. The pt denies having fevers at home, had a low grade temperature last night of 100.2, afebrile since then, WBC increased from normal to 14.35, AST is elevated, troponin negative x 2, UA and UCx negative, CTA - negative for PE, + extensive ground glass opacities bilaterally.   During examination, the pt is A&O x 3, no distress, on supplemental O2 NC 6 L- reduced to 4 L and doing well so far, RN notified. The pt denies having cough, no lack of smell or taste, no chest pain, no N/V, diarrhea, +constipation. The pt denies any urinary symptoms.   The pt was given one time doses of Rocephin and Zithromax in case he has CAP, was given one time dose of Decadron, COVID 19 testing is pending.       PMH/PSH--    BPH (benign prostatic hyperplasia)     Depression     H/O gastroesophageal reflux (GERD)     Hypertension     Prediabetes.   Heroin abuse, on Methadone  COPD  Hepatitis C, treated   Cervical radiculopathy    PAST SURGICAL HISTORY:  s/p cardiac cath      Allergies--  No Known Allergies      Medications--  Antibiotics:   Immunologic:   Other: acetaminophen   Tablet .. PRN  ALBUTerol    90 MICROgram(s) HFA Inhaler PRN  amLODIPine   Tablet  ARIPiprazole  aspirin enteric coated  donepezil  enoxaparin Injectable  finasteride  hydrochlorothiazide  lisinopril  methadone    Tablet  pantoprazole    Tablet  QUEtiapine  tamsulosin    Antimicrobials last 90 days per EMR: MEDICATIONS  (STANDING):  azithromycin  IVPB   255 mL/Hr IV Intermittent (12-30-20 @ 22:23)    cefTRIAXone   IVPB   100 mL/Hr IV Intermittent (12-31-20 @ 01:11)        Social History--  EtOH: denies   Tobacco: quit 10 years ago, smoked for 30 years 1 ppd  Drug Use: history of heroin abuse, last time 10 years ago, on methadone    FAMILY HISTORY:  FH: heart attack, mother in 70s  FH: kidney failure, mother  FH: type 2 diabetes, mother     Father - alive, does not know if he has any medical issues           Travel/Environmental/Occupational History: no recent travel      Review of Systems:  A >=10-point review of systems was obtained.     Pertinent positives and negatives--  Constitutional: No fevers. No Chills. No Rigors.   Eyes: no double vision  ENMT:  Cardiovascular: No chest pain. No palpitations.  Respiratory: + shortness of breath. No cough.  Gastrointestinal: No nausea or vomiting. No diarrhea + constipation.   Genitourinary: no dysuria, no flank pains  Musculoskeletal: no joint swelling  Skin: no rash  Neurologic:  Psychiatric: Pleasant. Appropriate affect.  Endocrine:  Heme/Lymphatic:  Allergy/Immunologic:    Review of systems otherwise negative except as previously noted.    Physical Exam--  Vital Signs: T(F): 98.7 (12-31-20 @ 12:40), Max: 100.2 (12-31-20 @ 04:59)  HR: 84 (12-31-20 @ 14:27)  BP: 146/78 (12-31-20 @ 14:27)  RR: 17 (12-31-20 @ 12:40)  SpO2: 93% (12-31-20 @ 12:40)  Wt(kg): --  General: Nontoxic-appearing Male in no acute distress. On supplemental O2 via NC  HEENT: AT/NC. PERRL. EOMI. Anicteric. Conjunctiva pink and moist. Oropharynx clear. Dentition fair.  Neck: Not rigid. No sense of mass.  Nodes: None palpable.  Lungs: bilateral fine crackles, without rales, wheezing or rhonchi  Heart: Regular rate and rhythm. No Murmur. No rub. No gallop. No palpable thrill.  Abdomen: Bowel sounds present and normoactive. Soft. Nondistended. Nontender. No sense of mass. No organomegaly.  Back: No spinal tenderness. No costovertebral angle tenderness.   Extremities: No cyanosis or clubbing. No edema.   Skin: Warm. Dry. Good turgor. No rash. No vasculitic stigmata. PIV ok  Psychiatric: Appropriate affect and mood for situation.         Laboratory & Imaging Data--  CBC                        11.8   14.35 )-----------( 220      ( 31 Dec 2020 04:40 )             34.5     WBC Count: 14.35 K/uL (12-31 @ 04:40)  WBC Count: 7.11 K/uL (12-30 @ 14:40)    Chemistries  12-31    131<L>  |  97  |  11  ----------------------------<  94  3.7   |  26  |  0.77    Creatinine Trend: 0.77<--, 0.95<--    Ca    7.9<L>      31 Dec 2020 04:40  Phos  3.0     12-31  Mg     2.1     12-31    TPro  7.1  /  Alb  2.9<L>  /  TBili  0.7  /  DBili  x   /  AST  77<H>  /  ALT  29  /  AlkPhos  72  12-31      Culture Data    Culture - Urine (collected 30 Dec 2020 18:14)  Source: .Urine Clean Catch (Midstream)  Final Report (31 Dec 2020 13:48):    <10,000 CFU/mL Normal Urogenital Renee      < from: Xray Chest 1 View AP/PA. (12.30.20 @ 17:02) >    EXAM:  XR CHEST AP OR PA 1V                            PROCEDURE DATE:  12/30/2020          INTERPRETATION:  Portable chest radiograph    CLINICAL INFORMATION: Assess for pneumonia    TECHNIQUE:  Portable  AP view of the chest was obtained.    COMPARISON: No previous examinations are available for review.    FINDINGS:  The lungs show bilateral peripheral diffuse airspace disease concerning for infectious pneumonia. No pneumothorax.    The heart and mediastinum are within normal limits.    Visualized osseous structures are intact.        IMPRESSION:   Bilateral subtle peripheral diffuse airspace disease concerning for infectious pneumonia..      LOKI OLWERY MD; Attending Radiologist  This document has been electronically signed. Dec 30 2020  5:29PM    < end of copied text >    < from: CT Angio Chest w/ IV Cont (12.30.20 @ 23:27) >    EXAM:  CT ANGIO CHEST (W)AW IC                            PROCEDURE DATE:  12/30/2020          INTERPRETATION:  CLINICAL INFORMATION: Dyspnea, evaluate for PE. History of COPD    COMPARISON: None.    PROCEDURE:  CT Angiography of the Chest.  90 ml of Omnipaque 350 was injected intravenously. 10 ml were discarded.  Sagittal and coronal reformats were performed as well as 3D (MIP) reconstructions.    FINDINGS:    LUNGS AND AIRWAYS: Patent central airways.  Paraseptal emphysema. Extensive bilateral areas of patchy and linear groundglass opacities with peripheral predominance.  PLEURA: No pleural effusion.  MEDIASTINUM AND IAN: No lymphadenopathy. Large hiatal hernia with intrathoracic stomach.  VESSELS: No central or lobar PE. Segmental and subsegmental branches are not well evaluated due to suboptimal opacification and motion artifact. Atherosclerotic changes of the aorta.  HEART: Heart size is normal. No pericardial effusion.  CHEST WALL AND LOWER NECK: Mild gynecomastia.  VISUALIZED UPPER ABDOMEN: Cholelithiasis. Left upper pole renal cortical scarring.  BONES: Mild degenerative changes of the thoracic spine.    IMPRESSION:    1. No central or lobar PE. Limited evaluation of segmental and subsegmental branches due to suboptimal opacification and motion artifact.  2. Extensive bilateral patchy and linear groundglass opacities with peripheral predominance, suspicious for Covid-19.  Correlation with PCR testing is recommended.        CHANA ROSA MD; Attending Radiologist  This document has been electronically signed. Dec 31 2020 12:36AM    < end of copied text >

## 2020-12-31 NOTE — CHART NOTE - NSCHARTNOTEFT_GEN_A_CORE
Hospitalist Medicine NP     Call to [facility] methadone clinic [phone], spoke with CORINE Cordero RN and Ms. Rcok MSW.   Medication dose verified- Currently on Methadone 40mg daily. Last dispensed 12/29/20 with next refill scheduled for 1/4/21.       CORNEL Santiago-BC  Medicine   [phone]

## 2020-12-31 NOTE — H&P ADULT - ASSESSMENT
71 y/o male, w/ PMHx of Heroin abuse on methadone), HTN, prediabetes, COPD  BPH, GERD, depression/anxiety, hepatitis C (s/p treatment), cervical radiculopathy, chronic low back pain, s/p Cardiac Cath last month after +ve stress presents to the ED stating "I think my aid called EMS because I was short of breath". Pt being admitted for PNA  71 y/o male, w/ PMHx of Heroin abuse on methadone), HTN, prediabetes, COPD  BPH, GERD, depression/anxiety, hepatitis C (s/p treatment), cervical radiculopathy, chronic low back pain, s/p Cardiac Cath last month after +ve stress presents to the ED stating "I think my aid called EMS because I was short of breath". Pt being admitted for PNA    IMPROVE VTE Individual Risk Assessment    RISK                                                                Points    [  ] Previous VTE                                                  3    [  ] Thrombophilia                                               2    [  ] Lower limb paralysis                                      2        (unable to hold up >15 seconds)      [  ] Current Cancer                                              2         (within 6 months)    [  ] Immobilization > 24 hrs                                1    [  ] ICU/CCU stay > 24 hours                              1    [  ] Age > 60                                                      1    IMPROVE VTE Score _________    IMPROVE Score 0-1: Low Risk, No VTE prophylaxis required for most patients, encourage ambulation.   IMPROVE Score 2-3: At risk, pharmacologic VTE prophylaxis is indicated for most patients (in the absence of a contraindication)  IMPROVE Score > or = 4: High Risk, pharmacologic VTE prophylaxis is indicated for most patients (in the absence of a contraindication)

## 2020-12-31 NOTE — PATIENT PROFILE ADULT - NSPROPTRIGHTBILLOFRIGHTS_GEN_A_NUR
ANXIETY    • Will report anxiety at manageable levels Progressing        Impaired Functional Mobility    • Achieve highest/safest level of mobility/gait Progressing        Patient/Family Goals    • Patient/Family Long Term Goal Progressing    • Patient/Fam patient

## 2020-12-31 NOTE — CHART NOTE - NSCHARTNOTEFT_GEN_A_CORE
pt is  febrile/ in ER   covid  pcr, pending  wbc of  14,000/  mild  hyponatremia  Hypoxia, on 6  L , O2   elevtaed  lft's. h/o  Hep C +  CT  chest  angio,  no PE,  extensive  GOO  suspirious   for  covid  pna  If confirmed,  then will  start,, Remdisivir  and Decadron       RA< from: CT Angio Chest w/ IV Cont (12.30.20 @ 23:27) >  IMPRESSION:  1. No central or lobar PE. Limited evaluation of segmental and subsegmental branches due to suboptimal opacification and motion artifact.  2. Extensive bilateral patchy and linear groundglass opacities with peripheral predominance, suspicious for Covid-19.  Correlation with PCR testing is recommended.  < end of copied text > pt is  febrile/ in ER   covid  pcr, pending  wbc of  14,000/  mild  hyponatremia  Hypoxia, on 6  L , O2   elevtaed  lft's. h/o  Hep C +  CT  chest  angio,  no PE,  extensive  GOO  suspirious   for  covid  pna  If confirmed,  then will  start,, Remdisivir  and Decadron  ID  dr langley, called       RA< from: CT Angio Chest w/ IV Cont (12.30.20 @ 23:27) >  IMPRESSION:  1. No central or lobar PE. Limited evaluation of segmental and subsegmental branches due to suboptimal opacification and motion artifact.  2. Extensive bilateral patchy and linear groundglass opacities with peripheral predominance, suspicious for Covid-19.  Correlation with PCR testing is recommended.  < end of copied text > pt is  febrile/ in ER   covid  pcr, pending  wbc of  14,000/  mild  hyponatremia  Hypoxia, on 6  L , O2   elevtaed  lft's. h/o  Hep C +. ammonia of 53/ has been  above 100  in the past/ pt alert,  ambulating in room, asking  when he can go  home  CT  chest  angio,  no PE,  extensive  GOO  suspirious   for  covid  pna  If confirmed,  then will  start,, Remdisivir  and Decadron  ID  dr langley, called       RA< from: CT Angio Chest w/ IV Cont (12.30.20 @ 23:27) >  IMPRESSION:  1. No central or lobar PE. Limited evaluation of segmental and subsegmental branches due to suboptimal opacification and motion artifact.  2. Extensive bilateral patchy and linear groundglass opacities with peripheral predominance, suspicious for Covid-19.  Correlation with PCR testing is recommended.  < end of copied text > pt is  febrile/ in ER   covid  pcr, pending  wbc of  14,000/  mild  hyponatremia  Hypoxia, on 6  L , O2   elevtaed  lft's. h/o  Hep C +. ammonia of 53/ has been  above 100  in the past/   pt alert,  ambulating in room, asking  when he can go  home/ on [id number]  mg.qd  CT  chest  angio,  no PE,  extensive  GOO  suspirious   for  covid  pna  If confirmed,  then will  start,, Remdisivir  and Decadron  ID  dr langley, called       RA< from: CT Angio Chest w/ IV Cont (12.30.20 @ 23:27) >  IMPRESSION:  1. No central or lobar PE. Limited evaluation of segmental and subsegmental branches due to suboptimal opacification and motion artifact.  2. Extensive bilateral patchy and linear groundglass opacities with peripheral predominance, suspicious for Covid-19.  Correlation with PCR testing is recommended.  < end of copied text >

## 2021-01-01 LAB
ALBUMIN SERPL ELPH-MCNC: 2.6 G/DL — LOW (ref 3.3–5)
ALP SERPL-CCNC: 70 U/L — SIGNIFICANT CHANGE UP (ref 40–120)
ALT FLD-CCNC: 27 U/L — SIGNIFICANT CHANGE UP (ref 12–78)
AST SERPL-CCNC: 59 U/L — HIGH (ref 15–37)
BILIRUB DIRECT SERPL-MCNC: 0.16 MG/DL — SIGNIFICANT CHANGE UP (ref 0.05–0.2)
BILIRUB INDIRECT FLD-MCNC: 0.2 MG/DL — SIGNIFICANT CHANGE UP (ref 0.2–1)
BILIRUB SERPL-MCNC: 0.4 MG/DL — SIGNIFICANT CHANGE UP (ref 0.2–1.2)
CREAT SERPL-MCNC: 0.7 MG/DL — SIGNIFICANT CHANGE UP (ref 0.5–1.3)
CRP SERPL-MCNC: 15.86 MG/DL — HIGH (ref 0–0.4)
D DIMER BLD IA.RAPID-MCNC: 260 NG/ML DDU — HIGH
FERRITIN SERPL-MCNC: 189 NG/ML — SIGNIFICANT CHANGE UP (ref 30–400)
PROT SERPL-MCNC: 6.8 GM/DL — SIGNIFICANT CHANGE UP (ref 6–8.3)
SARS-COV-2 IGG SERPL QL IA: POSITIVE
SARS-COV-2 IGM SERPL IA-ACNC: 1.75 INDEX — HIGH

## 2021-01-01 PROCEDURE — 99232 SBSQ HOSP IP/OBS MODERATE 35: CPT

## 2021-01-01 RX ORDER — REMDESIVIR 5 MG/ML
100 INJECTION INTRAVENOUS EVERY 24 HOURS
Refills: 0 | Status: COMPLETED | OUTPATIENT
Start: 2021-01-01 | End: 2021-01-04

## 2021-01-01 RX ADMIN — TAMSULOSIN HYDROCHLORIDE 0.8 MILLIGRAM(S): 0.4 CAPSULE ORAL at 21:15

## 2021-01-01 RX ADMIN — ARIPIPRAZOLE 5 MILLIGRAM(S): 15 TABLET ORAL at 12:47

## 2021-01-01 RX ADMIN — ENOXAPARIN SODIUM 40 MILLIGRAM(S): 100 INJECTION SUBCUTANEOUS at 12:12

## 2021-01-01 RX ADMIN — AMLODIPINE BESYLATE 5 MILLIGRAM(S): 2.5 TABLET ORAL at 05:48

## 2021-01-01 RX ADMIN — FINASTERIDE 5 MILLIGRAM(S): 5 TABLET, FILM COATED ORAL at 12:12

## 2021-01-01 RX ADMIN — REMDESIVIR 500 MILLIGRAM(S): 5 INJECTION INTRAVENOUS at 05:49

## 2021-01-01 RX ADMIN — METHADONE HYDROCHLORIDE 40 MILLIGRAM(S): 40 TABLET ORAL at 12:12

## 2021-01-01 RX ADMIN — Medication 6 MILLIGRAM(S): at 05:49

## 2021-01-01 RX ADMIN — PANTOPRAZOLE SODIUM 40 MILLIGRAM(S): 20 TABLET, DELAYED RELEASE ORAL at 06:30

## 2021-01-01 RX ADMIN — Medication 12.5 MILLIGRAM(S): at 05:49

## 2021-01-01 RX ADMIN — Medication 81 MILLIGRAM(S): at 12:12

## 2021-01-01 RX ADMIN — QUETIAPINE FUMARATE 100 MILLIGRAM(S): 200 TABLET, FILM COATED ORAL at 21:15

## 2021-01-01 RX ADMIN — LISINOPRIL 40 MILLIGRAM(S): 2.5 TABLET ORAL at 05:48

## 2021-01-01 RX ADMIN — REMDESIVIR 500 MILLIGRAM(S): 5 INJECTION INTRAVENOUS at 21:15

## 2021-01-01 NOTE — PROGRESS NOTE ADULT - ASSESSMENT
69 y/o male, w/ PMHx of Heroin abuse on methadone), HTN, prediabetes, COPD  BPH, GERD, depression/anxiety, hepatitis C (s/p treatment), cervical radiculopathy, chronic low back pain, s/p Cardiac Cath last month after +ve stress presents to the ED stating "I think my aid called EMS because I was short of breath". Pt being admitted for PNA    IMPROVE VTE Individual Risk Assessment    RISK                                                                Points    [  ] Previous VTE                                                  3    [  ] Thrombophilia                                               2    [  ] Lower limb paralysis                                      2        (unable to hold up >15 seconds)      [  ] Current Cancer                                              2         (within 6 months)    [  ] Immobilization > 24 hrs                                1    [  ] ICU/CCU stay > 24 hours                              1    [  ] Age > 60                                                      1    IMPROVE VTE Score _________    IMPROVE Score 0-1: Low Risk, No VTE prophylaxis required for most patients, encourage ambulation.   IMPROVE Score 2-3: At risk, pharmacologic VTE prophylaxis is indicated for most patients (in the absence of a contraindication)  IMPROVE Score > or = 4: High Risk, pharmacologic VTE prophylaxis is indicated for most patients (in the absence of a contraindication)  69 y/o male, w/ PMHx of Heroin abuse on methadone), HTN, prediabetes, COPD  BPH, GERD, depression/anxiety, hepatitis C (s/p treatment), cervical radiculopathy, chronic low back pain, s/p Cardiac Cath last month after +ve stress presents to the ED stating "I think my aid called EMS because I was short of breath". Pt being admitted for COVID PNA.

## 2021-01-01 NOTE — PROGRESS NOTE ADULT - PROBLEM SELECTOR PLAN 1
- CT scan show opacities suspicious for COVID  - COVID pending  - pt given 1 dose of decadron, Rocephin and Azithro  - f/u COVID; cont management according - CT scan show opacities consistent with COVID. COVID positive.  Start IV 5 days of   Remdiziver and Decadron.

## 2021-01-01 NOTE — PROGRESS NOTE ADULT - SUBJECTIVE AND OBJECTIVE BOX
INTERVAL HPI/OVERNIGHT EVENTS:  Pt seen and examined at bedside.     Allergies/Intolerance: No Known Allergies      MEDICATIONS  (STANDING):  amLODIPine   Tablet 5 milliGRAM(s) Oral daily  ARIPiprazole 5 milliGRAM(s) Oral daily  aspirin enteric coated 81 milliGRAM(s) Oral daily  dexAMETHasone  Injectable 6 milliGRAM(s) IV Push daily  enoxaparin Injectable 40 milliGRAM(s) SubCutaneous daily  finasteride 5 milliGRAM(s) Oral daily  hydrochlorothiazide 12.5 milliGRAM(s) Oral daily  lisinopril 40 milliGRAM(s) Oral daily  methadone    Tablet 40 milliGRAM(s) Oral daily  pantoprazole    Tablet 40 milliGRAM(s) Oral before breakfast  QUEtiapine 100 milliGRAM(s) Oral at bedtime  remdesivir  IVPB 100 milliGRAM(s) IV Intermittent every 24 hours  tamsulosin 0.8 milliGRAM(s) Oral at bedtime    MEDICATIONS  (PRN):  acetaminophen   Tablet .. 650 milliGRAM(s) Oral every 6 hours PRN Mild Pain (1 - 3)  ALBUTerol    90 MICROgram(s) HFA Inhaler 2 Puff(s) Inhalation every 6 hours PRN Shortness of Breath and/or Wheezing        ROS: all systems reviewed and wnl      PHYSICAL EXAMINATION:  Vital Signs Last 24 Hrs  T(C): 37 (2021 05:48), Max: 37.1 (31 Dec 2020 12:40)  T(F): 98.6 (2021 05:48), Max: 98.7 (31 Dec 2020 12:40)  HR: 71 (2021 05:48) (69 - 84)  BP: 117/65 (2021 05:48) (111/65 - 146/78)  BP(mean): --  RR: 18 (2021 05:48) (17 - 18)  SpO2: 92% (2021 05:48) (92% - 95%)  CAPILLARY BLOOD GLUCOSE           @ 07:01  -  - @ 07:00  --------------------------------------------------------  IN: 825 mL / OUT: 0 mL / NET: 825 mL        GENERAL:   NECK: supple, No JVD  CHEST/LUNG: clear to auscultation bilaterally; no rales, rhonchi, or wheezing b/l  HEART: normal S1, S2  ABDOMEN: BS+, soft, ND, NT   EXTREMITIES:  pulses palpable; no clubbing, cyanosis, or edema b/l LEs  SKIN: no rashes or lesions      LABS:                        11.8   14.35 )-----------( 220      ( 31 Dec 2020 04:40 )             34.5         x   |  x   |  x   ----------------------------<  x   x    |  x   |  0.70    Ca    7.9<L>      31 Dec 2020 04:40  Phos  3.0     -  Mg     2.1     -    TPro  6.8  /  Alb  2.6<L>  /  TBili  0.4  /  DBili  .16  /  AST  59<H>  /  ALT  27  /  AlkPhos  70        Urinalysis Basic - ( 30 Dec 2020 14:40 )    Color: Yellow / Appearance: Clear / S.015 / pH: x  Gluc: x / Ketone: Negative  / Bili: Negative / Urobili: 1 mg/dL   Blood: x / Protein: 15 mg/dL / Nitrite: Negative   Leuk Esterase: Negative / RBC: 0-2 /HPF / WBC x   Sq Epi: x / Non Sq Epi: Few / Bacteria: Few             INTERVAL HPI/OVERNIGHT EVENTS:  Pt seen and examined at bedside.     Allergies/Intolerance: No Known Allergies      MEDICATIONS  (STANDING):  amLODIPine   Tablet 5 milliGRAM(s) Oral daily  ARIPiprazole 5 milliGRAM(s) Oral daily  aspirin enteric coated 81 milliGRAM(s) Oral daily  dexAMETHasone  Injectable 6 milliGRAM(s) IV Push daily  enoxaparin Injectable 40 milliGRAM(s) SubCutaneous daily  finasteride 5 milliGRAM(s) Oral daily  hydrochlorothiazide 12.5 milliGRAM(s) Oral daily  lisinopril 40 milliGRAM(s) Oral daily  methadone    Tablet 40 milliGRAM(s) Oral daily  pantoprazole    Tablet 40 milliGRAM(s) Oral before breakfast  QUEtiapine 100 milliGRAM(s) Oral at bedtime  remdesivir  IVPB 100 milliGRAM(s) IV Intermittent every 24 hours  tamsulosin 0.8 milliGRAM(s) Oral at bedtime    MEDICATIONS  (PRN):  acetaminophen   Tablet .. 650 milliGRAM(s) Oral every 6 hours PRN Mild Pain (1 - 3)  ALBUTerol    90 MICROgram(s) HFA Inhaler 2 Puff(s) Inhalation every 6 hours PRN Shortness of Breath and/or Wheezing        ROS: all systems reviewed and wnl      PHYSICAL EXAMINATION:  Vital Signs Last 24 Hrs  T(C): 37 (2021 05:48), Max: 37.1 (31 Dec 2020 12:40)  T(F): 98.6 (2021 05:48), Max: 98.7 (31 Dec 2020 12:40)  HR: 71 (2021 05:48) (69 - 84)  BP: 117/65 (2021 05:48) (111/65 - 146/78)  BP(mean): --  RR: 18 (2021 05:48) (17 - 18)  SpO2: 92% (2021 05:48) (92% - 95%)  CAPILLARY BLOOD GLUCOSE           @ 07:01  -  - @ 07:00  --------------------------------------------------------  IN: 825 mL / OUT: 0 mL / NET: 825 mL        GENERAL: stable, comfortable on NC, no fevers or cough  NECK: supple, No JVD  CHEST/LUNG: clear to auscultation bilaterally; no rales, rhonchi, or wheezing b/l  HEART: normal S1, S2  ABDOMEN: BS+, soft, ND, NT   EXTREMITIES:  pulses palpable; no clubbing, cyanosis, or edema b/l LEs  SKIN: no rashes or lesions      LABS:                        11.8   14.35 )-----------( 220      ( 31 Dec 2020 04:40 )             34.5     01-    x   |  x   |  x   ----------------------------<  x   x    |  x   |  0.70    Ca    7.9<L>      31 Dec 2020 04:40  Phos  3.0     12-  Mg     2.1     12-    TPro  6.8  /  Alb  2.6<L>  /  TBili  0.4  /  DBili  .16  /  AST  59<H>  /  ALT  27  /  AlkPhos  70  01-      Urinalysis Basic - ( 30 Dec 2020 14:40 )    Color: Yellow / Appearance: Clear / S.015 / pH: x  Gluc: x / Ketone: Negative  / Bili: Negative / Urobili: 1 mg/dL   Blood: x / Protein: 15 mg/dL / Nitrite: Negative   Leuk Esterase: Negative / RBC: 0-2 /HPF / WBC x   Sq Epi: x / Non Sq Epi: Few / Bacteria: Few

## 2021-01-02 PROCEDURE — 99232 SBSQ HOSP IP/OBS MODERATE 35: CPT

## 2021-01-02 RX ADMIN — TAMSULOSIN HYDROCHLORIDE 0.8 MILLIGRAM(S): 0.4 CAPSULE ORAL at 21:38

## 2021-01-02 RX ADMIN — Medication 6 MILLIGRAM(S): at 05:31

## 2021-01-02 RX ADMIN — Medication 12.5 MILLIGRAM(S): at 05:31

## 2021-01-02 RX ADMIN — Medication 81 MILLIGRAM(S): at 11:52

## 2021-01-02 RX ADMIN — LISINOPRIL 40 MILLIGRAM(S): 2.5 TABLET ORAL at 05:31

## 2021-01-02 RX ADMIN — METHADONE HYDROCHLORIDE 40 MILLIGRAM(S): 40 TABLET ORAL at 11:55

## 2021-01-02 RX ADMIN — REMDESIVIR 500 MILLIGRAM(S): 5 INJECTION INTRAVENOUS at 21:39

## 2021-01-02 RX ADMIN — QUETIAPINE FUMARATE 100 MILLIGRAM(S): 200 TABLET, FILM COATED ORAL at 21:36

## 2021-01-02 RX ADMIN — ARIPIPRAZOLE 5 MILLIGRAM(S): 15 TABLET ORAL at 11:52

## 2021-01-02 RX ADMIN — FINASTERIDE 5 MILLIGRAM(S): 5 TABLET, FILM COATED ORAL at 11:52

## 2021-01-02 RX ADMIN — AMLODIPINE BESYLATE 5 MILLIGRAM(S): 2.5 TABLET ORAL at 05:31

## 2021-01-02 RX ADMIN — ENOXAPARIN SODIUM 40 MILLIGRAM(S): 100 INJECTION SUBCUTANEOUS at 11:53

## 2021-01-02 RX ADMIN — PANTOPRAZOLE SODIUM 40 MILLIGRAM(S): 20 TABLET, DELAYED RELEASE ORAL at 06:04

## 2021-01-02 NOTE — PROGRESS NOTE ADULT - SUBJECTIVE AND OBJECTIVE BOX
Patient is a 70y old  Male who presents with a chief complaint of PNA (01 Jan 2021 10:03)      INTERVAL HPI/OVERNIGHT EVENTS: no events     MEDICATIONS  (STANDING):  amLODIPine   Tablet 5 milliGRAM(s) Oral daily  ARIPiprazole 5 milliGRAM(s) Oral daily  aspirin enteric coated 81 milliGRAM(s) Oral daily  dexAMETHasone  Injectable 6 milliGRAM(s) IV Push daily  enoxaparin Injectable 40 milliGRAM(s) SubCutaneous daily  finasteride 5 milliGRAM(s) Oral daily  hydrochlorothiazide 12.5 milliGRAM(s) Oral daily  lisinopril 40 milliGRAM(s) Oral daily  methadone    Tablet 40 milliGRAM(s) Oral daily  pantoprazole    Tablet 40 milliGRAM(s) Oral before breakfast  QUEtiapine 100 milliGRAM(s) Oral at bedtime  remdesivir  IVPB 100 milliGRAM(s) IV Intermittent every 24 hours  tamsulosin 0.8 milliGRAM(s) Oral at bedtime    MEDICATIONS  (PRN):  acetaminophen   Tablet .. 650 milliGRAM(s) Oral every 6 hours PRN Mild Pain (1 - 3)  ALBUTerol    90 MICROgram(s) HFA Inhaler 2 Puff(s) Inhalation every 6 hours PRN Shortness of Breath and/or Wheezing      Allergies    No Known Allergies    Intolerances           Vital Signs Last 24 Hrs  T(C): 36.6 (02 Jan 2021 10:42), Max: 36.8 (02 Jan 2021 00:49)  T(F): 97.8 (02 Jan 2021 10:42), Max: 98.3 (02 Jan 2021 00:49)  HR: 76 (02 Jan 2021 10:42) (63 - 76)  BP: 126/69 (02 Jan 2021 10:42) (112/51 - 134/67)  BP(mean): --  RR: 18 (02 Jan 2021 10:42) (17 - 18)  SpO2: 95% (02 Jan 2021 10:42) (92% - 95%)    PHYSICAL EXAM:  GENERAL: NAD, well-groomed, well-developed  HEAD:  Atraumatic, Normocephalic  EYES: EOMI, PERRLA, conjunctiva and sclera clear  ENMT: No tonsillar erythema, exudates, or enlargement; Moist mucous membranes, Good dentition, No lesions  NECK: Supple, No JVD, Normal thyroid  NERVOUS SYSTEM:  Alert & Oriented X3, Good concentration; Motor Strength 5/5 B/L upper and lower extremities; DTRs 2+ intact and symmetric  CHEST/LUNG: Clear to percussion bilaterally; No rales, rhonchi, wheezing, or rubs  HEART: Regular rate and rhythm; No murmurs, rubs, or gallops  ABDOMEN: Soft, Nontender, Nondistended; Bowel sounds present  EXTREMITIES:  2+ Peripheral Pulses, No clubbing, cyanosis, or edema  LYMPH: No lymphadenopathy noted  SKIN: No rashes or lesions    LABS:    01-01    x   |  x   |  x   ----------------------------<  x   x    |  x   |  0.70      TPro  6.8  /  Alb  2.6<L>  /  TBili  0.4  /  DBili  .16  /  AST  59<H>  /  ALT  27  /  AlkPhos  70  01-01        CAPILLARY BLOOD GLUCOSE          RADIOLOGY & ADDITIONAL TESTS:    Imaging Personally Reviewed:  [ X] YES  [ ] NO    Consultant(s) Notes Reviewed:  [ X] YES  [ ] NO    Care Discussed with Consultants/Other Providers [X ] YES  [ ] NO

## 2021-01-02 NOTE — PROGRESS NOTE ADULT - PROBLEM SELECTOR PLAN 1
- CT scan show opacities consistent with COVID. COVID positive.  Start IV 5 days of   Remdiziver and Decadron.

## 2021-01-02 NOTE — PROGRESS NOTE ADULT - ASSESSMENT
69 y/o male, w/ PMHx of Heroin abuse on methadone), HTN, prediabetes, COPD  BPH, GERD, depression/anxiety, hepatitis C (s/p treatment), cervical radiculopathy, chronic low back pain, s/p Cardiac Cath last month after +ve stress presents to the ED stating "I think my aid called EMS because I was short of breath". Pt being admitted for COVID PNA.

## 2021-01-03 PROCEDURE — 99232 SBSQ HOSP IP/OBS MODERATE 35: CPT

## 2021-01-03 RX ADMIN — METHADONE HYDROCHLORIDE 40 MILLIGRAM(S): 40 TABLET ORAL at 11:25

## 2021-01-03 RX ADMIN — Medication 81 MILLIGRAM(S): at 11:25

## 2021-01-03 RX ADMIN — AMLODIPINE BESYLATE 5 MILLIGRAM(S): 2.5 TABLET ORAL at 05:38

## 2021-01-03 RX ADMIN — TAMSULOSIN HYDROCHLORIDE 0.8 MILLIGRAM(S): 0.4 CAPSULE ORAL at 21:49

## 2021-01-03 RX ADMIN — QUETIAPINE FUMARATE 100 MILLIGRAM(S): 200 TABLET, FILM COATED ORAL at 21:49

## 2021-01-03 RX ADMIN — LISINOPRIL 40 MILLIGRAM(S): 2.5 TABLET ORAL at 05:40

## 2021-01-03 RX ADMIN — PANTOPRAZOLE SODIUM 40 MILLIGRAM(S): 20 TABLET, DELAYED RELEASE ORAL at 06:37

## 2021-01-03 RX ADMIN — ENOXAPARIN SODIUM 40 MILLIGRAM(S): 100 INJECTION SUBCUTANEOUS at 11:25

## 2021-01-03 RX ADMIN — Medication 6 MILLIGRAM(S): at 05:39

## 2021-01-03 RX ADMIN — Medication 12.5 MILLIGRAM(S): at 05:39

## 2021-01-03 RX ADMIN — ARIPIPRAZOLE 5 MILLIGRAM(S): 15 TABLET ORAL at 11:25

## 2021-01-03 RX ADMIN — FINASTERIDE 5 MILLIGRAM(S): 5 TABLET, FILM COATED ORAL at 11:25

## 2021-01-03 RX ADMIN — REMDESIVIR 500 MILLIGRAM(S): 5 INJECTION INTRAVENOUS at 21:49

## 2021-01-03 NOTE — PROGRESS NOTE ADULT - ASSESSMENT
71 y/o male, w/ PMHx of Heroin abuse on methadone), HTN, prediabetes, COPD  BPH, GERD, depression/anxiety, hepatitis C (s/p treatment), cervical radiculopathy, chronic low back pain, s/p Cardiac Cath last month after +ve stress presents to the ED stating "I think my aid called EMS because I was short of breath". Pt being admitted for COVID PNA.

## 2021-01-03 NOTE — PROGRESS NOTE ADULT - SUBJECTIVE AND OBJECTIVE BOX
Patient is a 70y old  Male who presents with a chief complaint of PNA (02 Jan 2021 10:48)      INTERVAL HPI/OVERNIGHT EVENTS: no events     MEDICATIONS  (STANDING):  amLODIPine   Tablet 5 milliGRAM(s) Oral daily  ARIPiprazole 5 milliGRAM(s) Oral daily  aspirin enteric coated 81 milliGRAM(s) Oral daily  dexAMETHasone  Injectable 6 milliGRAM(s) IV Push daily  enoxaparin Injectable 40 milliGRAM(s) SubCutaneous daily  finasteride 5 milliGRAM(s) Oral daily  hydrochlorothiazide 12.5 milliGRAM(s) Oral daily  lisinopril 40 milliGRAM(s) Oral daily  methadone    Tablet 40 milliGRAM(s) Oral daily  pantoprazole    Tablet 40 milliGRAM(s) Oral before breakfast  QUEtiapine 100 milliGRAM(s) Oral at bedtime  remdesivir  IVPB 100 milliGRAM(s) IV Intermittent every 24 hours  tamsulosin 0.8 milliGRAM(s) Oral at bedtime    MEDICATIONS  (PRN):  acetaminophen   Tablet .. 650 milliGRAM(s) Oral every 6 hours PRN Mild Pain (1 - 3)  ALBUTerol    90 MICROgram(s) HFA Inhaler 2 Puff(s) Inhalation every 6 hours PRN Shortness of Breath and/or Wheezing      Allergies    No Known Allergies    Intolerances           Vital Signs Last 24 Hrs  T(C): 36.6 (03 Jan 2021 05:15), Max: 36.6 (02 Jan 2021 10:42)  T(F): 97.8 (03 Jan 2021 05:15), Max: 97.8 (02 Jan 2021 10:42)  HR: 61 (03 Jan 2021 05:15) (60 - 76)  BP: 130/60 (03 Jan 2021 05:15) (125/66 - 130/60)  BP(mean): --  RR: 18 (03 Jan 2021 05:15) (18 - 18)  SpO2: 94% (03 Jan 2021 05:15) (94% - 95%)    PHYSICAL EXAM:  GENERAL: NAD, well-groomed, well-developed  HEAD:  Atraumatic, Normocephalic  EYES: EOMI, PERRLA, conjunctiva and sclera clear  ENMT: No tonsillar erythema, exudates, or enlargement; Moist mucous membranes, Good dentition, No lesions  NECK: Supple, No JVD, Normal thyroid  NERVOUS SYSTEM:  Alert & Oriented X3, Good concentration; Motor Strength 5/5 B/L upper and lower extremities; DTRs 2+ intact and symmetric  CHEST/LUNG: Clear to percussion bilaterally; No rales, rhonchi, wheezing, or rubs  HEART: Regular rate and rhythm; No murmurs, rubs, or gallops  ABDOMEN: Soft, Nontender, Nondistended; Bowel sounds present  EXTREMITIES:  2+ Peripheral Pulses, No clubbing, cyanosis, or edema  LYMPH: No lymphadenopathy noted  SKIN: No rashes or lesions    LABS:              CAPILLARY BLOOD GLUCOSE          RADIOLOGY & ADDITIONAL TESTS:    Imaging Personally Reviewed:  [ X] YES  [ ] NO    Consultant(s) Notes Reviewed:  [ X] YES  [ ] NO    Care Discussed with Consultants/Other Providers [X ] YES  [ ] NO

## 2021-01-04 LAB
ANION GAP SERPL CALC-SCNC: 7 MMOL/L — SIGNIFICANT CHANGE UP (ref 5–17)
BUN SERPL-MCNC: 20 MG/DL — SIGNIFICANT CHANGE UP (ref 7–23)
CALCIUM SERPL-MCNC: 8.3 MG/DL — LOW (ref 8.5–10.1)
CHLORIDE SERPL-SCNC: 103 MMOL/L — SIGNIFICANT CHANGE UP (ref 96–108)
CO2 SERPL-SCNC: 28 MMOL/L — SIGNIFICANT CHANGE UP (ref 22–31)
CREAT SERPL-MCNC: 0.72 MG/DL — SIGNIFICANT CHANGE UP (ref 0.5–1.3)
GLUCOSE SERPL-MCNC: 92 MG/DL — SIGNIFICANT CHANGE UP (ref 70–99)
HCT VFR BLD CALC: 38.4 % — LOW (ref 39–50)
HGB BLD-MCNC: 12.7 G/DL — LOW (ref 13–17)
MCHC RBC-ENTMCNC: 26.4 PG — LOW (ref 27–34)
MCHC RBC-ENTMCNC: 33.1 GM/DL — SIGNIFICANT CHANGE UP (ref 32–36)
MCV RBC AUTO: 79.8 FL — LOW (ref 80–100)
NRBC # BLD: 0 /100 WBCS — SIGNIFICANT CHANGE UP (ref 0–0)
PLATELET # BLD AUTO: 417 K/UL — HIGH (ref 150–400)
POTASSIUM SERPL-MCNC: 4 MMOL/L — SIGNIFICANT CHANGE UP (ref 3.5–5.3)
POTASSIUM SERPL-SCNC: 4 MMOL/L — SIGNIFICANT CHANGE UP (ref 3.5–5.3)
RBC # BLD: 4.81 M/UL — SIGNIFICANT CHANGE UP (ref 4.2–5.8)
RBC # FLD: 14.1 % — SIGNIFICANT CHANGE UP (ref 10.3–14.5)
SODIUM SERPL-SCNC: 138 MMOL/L — SIGNIFICANT CHANGE UP (ref 135–145)
WBC # BLD: 13.17 K/UL — HIGH (ref 3.8–10.5)
WBC # FLD AUTO: 13.17 K/UL — HIGH (ref 3.8–10.5)

## 2021-01-04 PROCEDURE — 99232 SBSQ HOSP IP/OBS MODERATE 35: CPT

## 2021-01-04 RX ADMIN — TAMSULOSIN HYDROCHLORIDE 0.8 MILLIGRAM(S): 0.4 CAPSULE ORAL at 21:40

## 2021-01-04 RX ADMIN — Medication 6 MILLIGRAM(S): at 05:19

## 2021-01-04 RX ADMIN — Medication 12.5 MILLIGRAM(S): at 05:19

## 2021-01-04 RX ADMIN — LISINOPRIL 40 MILLIGRAM(S): 2.5 TABLET ORAL at 05:19

## 2021-01-04 RX ADMIN — REMDESIVIR 500 MILLIGRAM(S): 5 INJECTION INTRAVENOUS at 21:40

## 2021-01-04 RX ADMIN — AMLODIPINE BESYLATE 5 MILLIGRAM(S): 2.5 TABLET ORAL at 05:19

## 2021-01-04 RX ADMIN — METHADONE HYDROCHLORIDE 40 MILLIGRAM(S): 40 TABLET ORAL at 12:51

## 2021-01-04 RX ADMIN — ARIPIPRAZOLE 5 MILLIGRAM(S): 15 TABLET ORAL at 12:51

## 2021-01-04 RX ADMIN — ENOXAPARIN SODIUM 40 MILLIGRAM(S): 100 INJECTION SUBCUTANEOUS at 12:51

## 2021-01-04 RX ADMIN — FINASTERIDE 5 MILLIGRAM(S): 5 TABLET, FILM COATED ORAL at 12:51

## 2021-01-04 RX ADMIN — QUETIAPINE FUMARATE 100 MILLIGRAM(S): 200 TABLET, FILM COATED ORAL at 21:40

## 2021-01-04 RX ADMIN — PANTOPRAZOLE SODIUM 40 MILLIGRAM(S): 20 TABLET, DELAYED RELEASE ORAL at 07:00

## 2021-01-04 RX ADMIN — Medication 81 MILLIGRAM(S): at 12:51

## 2021-01-04 NOTE — PROGRESS NOTE ADULT - ASSESSMENT
69 y/o male with PMHx of Heroin abuse on methadone, HTN, prediabetes, COPD  BPH, GERD, depression/anxiety, hepatitis C (s/p treatment), cervical radiculopathy, chronic low back pain, s/p Cardiac Cath last month (showed non-obstructive CAD) after c/o SOB exertion and having a +ve stress test, was brought to the hospital with shortness of breath, found to have O2 SAT 88% on RA, placed on NC 6 L, improved. The pt had one time low grade fever of 100.2, no fevers at home, WBC 14.35, normal renal function, UCx is negative, CTA negative for PE, positive for bilateral ground glass opacities, extensive. AST is elevated, ALT normal.   During examination, the pt is A&O x 3, no distress, on supplemental O2 NC 6 L- reduced to 4 L and doing well so far, RN notified. The pt denies having cough, no lack of smell or taste, no chest pain, no N/V, diarrhea, +constipation. The pt denies any urinary symptoms.   The pt was given one time doses of Rocephin and Zithromax in case he has CAP, was given one time dose of Decadron, COVID 19 testing is pending.   Pt's abd exam is benign, US abd pending.     1/4: pt is afebrile, WBC decreased to 13.17, on Decadron, with normal renal function, on supplemental O2 via NC, needs O2 SAT on RA, spoke with RN - will obtain.     1. COVID 19 Pneumonia  2. Fever - improved  3. Leukocytosis - decreasing   4. Hypoxia    Plan  - continue Decadron x 10 days  - continue remdesivir x 5 days   - supplemental O2 for O2 SAT > 92%  - monitor pt's temperatures  - monitor WBC  - monitor pt's renal function daily  - monitor hepatic function daily   - precautions as per protocol  - anticoagulation as per protocol  - trend inflammatory markers  - obtain O2 SAT on RA

## 2021-01-04 NOTE — PROGRESS NOTE ADULT - SUBJECTIVE AND OBJECTIVE BOX
afebrile  / on nasal canula  REVIEW OF SYSTEMS:  GEN: no fever,    no chills  RESP: no SOB,   no cough  CVS: no chest pain,   no palpitations  GI: no abdominal pain,   no nausea,   no vomiting,   no constipation,   no diarrhea  : no dysuria,   no frequency  NEURO: no headache,   no dizziness  PSYCH: no depression,   not anxious  Derm : no rash    MEDICATIONS  (STANDING):  amLODIPine   Tablet 5 milliGRAM(s) Oral daily  ARIPiprazole 5 milliGRAM(s) Oral daily  aspirin enteric coated 81 milliGRAM(s) Oral daily  dexAMETHasone  Injectable 6 milliGRAM(s) IV Push daily  enoxaparin Injectable 40 milliGRAM(s) SubCutaneous daily  finasteride 5 milliGRAM(s) Oral daily  hydrochlorothiazide 12.5 milliGRAM(s) Oral daily  lisinopril 40 milliGRAM(s) Oral daily  methadone    Tablet 40 milliGRAM(s) Oral daily  pantoprazole    Tablet 40 milliGRAM(s) Oral before breakfast  QUEtiapine 100 milliGRAM(s) Oral at bedtime  remdesivir  IVPB 100 milliGRAM(s) IV Intermittent every 24 hours  tamsulosin 0.8 milliGRAM(s) Oral at bedtime    MEDICATIONS  (PRN):  acetaminophen   Tablet .. 650 milliGRAM(s) Oral every 6 hours PRN Mild Pain (1 - 3)  ALBUTerol    90 MICROgram(s) HFA Inhaler 2 Puff(s) Inhalation every 6 hours PRN Shortness of Breath and/or Wheezing      Vital Signs Last 24 Hrs  T(C): 36.7 (04 Jan 2021 04:57), Max: 36.7 (04 Jan 2021 00:14)  T(F): 98.1 (04 Jan 2021 04:57), Max: 98.1 (04 Jan 2021 04:57)  HR: 63 (04 Jan 2021 04:57) (56 - 69)  BP: 135/70 (04 Jan 2021 04:57) (111/69 - 144/75)  BP(mean): --  RR: 18 (04 Jan 2021 04:57) (18 - 18)  SpO2: 95% (04 Jan 2021 04:57) (94% - 97%)  CAPILLARY BLOOD GLUCOSE        I&O's Summary    03 Jan 2021 07:01  -  04 Jan 2021 07:00  --------------------------------------------------------  IN: 550 mL / OUT: 1600 mL / NET: -1050 mL        PHYSICAL EXAM:  HEAD:  Atraumatic, Normocephalic  NECK: Supple, No   JVD  CHEST/LUNG:   no     rales,     no,    rhonchi  HEART: Regular rate and rhythm;         murmur  ABDOMEN: Soft, Nontender, ;   EXTREMITIES:   no     edema  NEUROLOGY:  alert    LABS:                        12.7   13.17 )-----------( 417      ( 04 Jan 2021 06:41 )             38.4     01-04    138  |  103  |  20  ----------------------------<  92  4.0   |  28  |  0.72    Ca    8.3<L>      04 Jan 2021 06:41                              Consultant(s) Notes Reviewed:      Care Discussed with Consultants/Other Providers:

## 2021-01-04 NOTE — PROGRESS NOTE ADULT - ASSESSMENT
70  yr   wbc of  14,000/  mild  hyponatremia,  Hypoxia, on 6  L , O2 on arrival   elevtaed  lft's. h/o  Hep C +. ammonia of 53/ has been  above 100  in the past   pt alert,  on [id number]  mg.qd     Hypoxia , from , COVID pcr+ and  covid  pneumonia  CT  chest  angio,  no PE,  extensive  GOO  on , Remdisivir  day  4   and Decadron  ID  dr langley  CRP 15/  mild  elevation in lft;s  on lovenox  qd/  BMI is  24       RA< from: CT Angio Chest w/ IV Cont (12.30.20 @ 23:27) >  IMPRESSION:  1. No central or lobar PE. Limited evaluation of segmental and subsegmental branches due to suboptimal opacification and motion artifact.  2. Extensive bilateral patchy and linear groundglass opacities with peripheral predominance, suspicious for Covid-19.  Correlation with PCR testing is recommended.  < end of copied text >.

## 2021-01-04 NOTE — PROGRESS NOTE ADULT - SUBJECTIVE AND OBJECTIVE BOX
MARGY HINOJOSA  MRN-[id number]    Follow Up:  COVID 19 pneumonia     Interval History: The pt was seen and examined, no distress. The pt is COVID 19 positive, afebrile, on supplemental O2, WBC decreased 13.17, with normal renal function, UCx with no growth. The pt is on supplemental O2 via NC - discussed with RN to obtain O2 SAT on less O2 and on RA.    ROS:    [ ] Unobtainable because:  [ ] All other systems negative    Constitutional: no fever, no chills  Head: no trauma  Eyes: no vision changes, no eye pain  ENT:  no sore throat, no rhinorrhea  Cardiovascular:  no chest pain, no palpitation  Respiratory:  + SOB, no cough  GI:  no abd pain, no vomiting, no diarrhea  urinary: no dysuria, no hematuria, no flank pain  musculoskeletal:  no joint pain, no joint swelling  skin:  no rash  neurology:  no headache, no seizure, no change in mental status  psych: no anxiety, no depression         Allergies  No Known Allergies        ANTIMICROBIALS:  remdesivir  IVPB 100 every 24 hours      OTHER MEDS:  acetaminophen   Tablet .. 650 milliGRAM(s) Oral every 6 hours PRN  ALBUTerol    90 MICROgram(s) HFA Inhaler 2 Puff(s) Inhalation every 6 hours PRN  amLODIPine   Tablet 5 milliGRAM(s) Oral daily  ARIPiprazole 5 milliGRAM(s) Oral daily  aspirin enteric coated 81 milliGRAM(s) Oral daily  dexAMETHasone  Injectable 6 milliGRAM(s) IV Push daily  enoxaparin Injectable 40 milliGRAM(s) SubCutaneous daily  finasteride 5 milliGRAM(s) Oral daily  hydrochlorothiazide 12.5 milliGRAM(s) Oral daily  lisinopril 40 milliGRAM(s) Oral daily  methadone    Tablet 40 milliGRAM(s) Oral daily  pantoprazole    Tablet 40 milliGRAM(s) Oral before breakfast  QUEtiapine 100 milliGRAM(s) Oral at bedtime  tamsulosin 0.8 milliGRAM(s) Oral at bedtime      Vital Signs Last 24 Hrs  T(C): 36.4 (04 Jan 2021 11:50), Max: 36.7 (04 Jan 2021 00:14)  T(F): 97.5 (04 Jan 2021 11:50), Max: 98.1 (04 Jan 2021 04:57)  HR: 73 (04 Jan 2021 11:50) (55 - 73)  BP: 106/68 (04 Jan 2021 11:50) (106/68 - 144/75)  BP(mean): --  RR: 19 (04 Jan 2021 11:50) (18 - 19)  SpO2: 98% (04 Jan 2021 11:50) (94% - 98%)    Physical Exam:  General: Nontoxic-appearing Male in no acute distress. On supplemental O2 via NC  HEENT: AT/NC. PERRL. EOMI. Anicteric. Conjunctiva pink and moist. Oropharynx clear. Dentition fair.  Neck: Not rigid. No sense of mass.  Nodes: None palpable.  Lungs: bilateral fine crackles - improved slightly, without rales, wheezing or rhonchi  Heart: Regular rate and rhythm. No Murmur. No rub. No gallop. No palpable thrill.  Abdomen: Bowel sounds present and normoactive. Soft. Nondistended. Nontender. No sense of mass. No organomegaly.  Back: No spinal tenderness. No costovertebral angle tenderness.   Extremities: No cyanosis or clubbing. No edema.   Skin: Warm. Dry. Good turgor. No rash. No vasculitic stigmata. PIV ok  Psychiatric: Appropriate affect and mood for situation.     WBC Count: 13.17 K/uL (01-04 @ 06:41)  WBC Count: 14.35 K/uL (12-31 @ 04:40)  WBC Count: 7.11 K/uL (12-30 @ 14:40)                            12.7   13.17 )-----------( 417      ( 04 Jan 2021 06:41 )             38.4       01-04    138  |  103  |  20  ----------------------------<  92  4.0   |  28  |  0.72    Ca    8.3<L>      04 Jan 2021 06:41            Creatinine Trend: 0.72<--, 0.70<--, 0.77<--, 0.95<--      MICROBIOLOGY:  v  .Urine Clean Catch (Midstream)  12-30-20   <10,000 CFU/mL Normal Urogenital Renee  --  --              COVID-19 PCR: Detected (12-30)    C-Reactive Protein, Serum: 15.86 (01-01)    Ferritin, Serum: 189 (01-01)      D-Dimer Assay, Quantitative: 260 (01-01)        RADIOLOGY:

## 2021-01-05 LAB
ALBUMIN SERPL ELPH-MCNC: 2.6 G/DL — LOW (ref 3.3–5)
ALP SERPL-CCNC: 66 U/L — SIGNIFICANT CHANGE UP (ref 40–120)
ALT FLD-CCNC: 29 U/L — SIGNIFICANT CHANGE UP (ref 12–78)
ANION GAP SERPL CALC-SCNC: 4 MMOL/L — LOW (ref 5–17)
AST SERPL-CCNC: 20 U/L — SIGNIFICANT CHANGE UP (ref 15–37)
BILIRUB SERPL-MCNC: 0.3 MG/DL — SIGNIFICANT CHANGE UP (ref 0.2–1.2)
BUN SERPL-MCNC: 19 MG/DL — SIGNIFICANT CHANGE UP (ref 7–23)
CALCIUM SERPL-MCNC: 8.5 MG/DL — SIGNIFICANT CHANGE UP (ref 8.5–10.1)
CHLORIDE SERPL-SCNC: 103 MMOL/L — SIGNIFICANT CHANGE UP (ref 96–108)
CO2 SERPL-SCNC: 30 MMOL/L — SIGNIFICANT CHANGE UP (ref 22–31)
CREAT SERPL-MCNC: 0.76 MG/DL — SIGNIFICANT CHANGE UP (ref 0.5–1.3)
CRP SERPL-MCNC: 1.36 MG/DL — HIGH (ref 0–0.4)
D DIMER BLD IA.RAPID-MCNC: 418 NG/ML DDU — HIGH
FERRITIN SERPL-MCNC: 97 NG/ML — SIGNIFICANT CHANGE UP (ref 30–400)
GLUCOSE SERPL-MCNC: 84 MG/DL — SIGNIFICANT CHANGE UP (ref 70–99)
HCT VFR BLD CALC: 39.2 % — SIGNIFICANT CHANGE UP (ref 39–50)
HGB BLD-MCNC: 13.2 G/DL — SIGNIFICANT CHANGE UP (ref 13–17)
LDH SERPL L TO P-CCNC: 417 U/L — HIGH (ref 50–242)
MCHC RBC-ENTMCNC: 26.6 PG — LOW (ref 27–34)
MCHC RBC-ENTMCNC: 33.7 GM/DL — SIGNIFICANT CHANGE UP (ref 32–36)
MCV RBC AUTO: 78.9 FL — LOW (ref 80–100)
NRBC # BLD: 0 /100 WBCS — SIGNIFICANT CHANGE UP (ref 0–0)
PLATELET # BLD AUTO: 442 K/UL — HIGH (ref 150–400)
POTASSIUM SERPL-MCNC: 3.9 MMOL/L — SIGNIFICANT CHANGE UP (ref 3.5–5.3)
POTASSIUM SERPL-SCNC: 3.9 MMOL/L — SIGNIFICANT CHANGE UP (ref 3.5–5.3)
PROT SERPL-MCNC: 6.8 GM/DL — SIGNIFICANT CHANGE UP (ref 6–8.3)
RBC # BLD: 4.97 M/UL — SIGNIFICANT CHANGE UP (ref 4.2–5.8)
RBC # FLD: 14.2 % — SIGNIFICANT CHANGE UP (ref 10.3–14.5)
SODIUM SERPL-SCNC: 137 MMOL/L — SIGNIFICANT CHANGE UP (ref 135–145)
WBC # BLD: 12.53 K/UL — HIGH (ref 3.8–10.5)
WBC # FLD AUTO: 12.53 K/UL — HIGH (ref 3.8–10.5)

## 2021-01-05 PROCEDURE — 99232 SBSQ HOSP IP/OBS MODERATE 35: CPT

## 2021-01-05 RX ADMIN — AMLODIPINE BESYLATE 5 MILLIGRAM(S): 2.5 TABLET ORAL at 11:04

## 2021-01-05 RX ADMIN — Medication 81 MILLIGRAM(S): at 11:06

## 2021-01-05 RX ADMIN — ARIPIPRAZOLE 5 MILLIGRAM(S): 15 TABLET ORAL at 11:04

## 2021-01-05 RX ADMIN — QUETIAPINE FUMARATE 100 MILLIGRAM(S): 200 TABLET, FILM COATED ORAL at 22:35

## 2021-01-05 RX ADMIN — TAMSULOSIN HYDROCHLORIDE 0.8 MILLIGRAM(S): 0.4 CAPSULE ORAL at 22:35

## 2021-01-05 RX ADMIN — ENOXAPARIN SODIUM 40 MILLIGRAM(S): 100 INJECTION SUBCUTANEOUS at 11:04

## 2021-01-05 RX ADMIN — FINASTERIDE 5 MILLIGRAM(S): 5 TABLET, FILM COATED ORAL at 11:04

## 2021-01-05 RX ADMIN — PANTOPRAZOLE SODIUM 40 MILLIGRAM(S): 20 TABLET, DELAYED RELEASE ORAL at 06:27

## 2021-01-05 RX ADMIN — METHADONE HYDROCHLORIDE 40 MILLIGRAM(S): 40 TABLET ORAL at 11:04

## 2021-01-05 RX ADMIN — Medication 6 MILLIGRAM(S): at 06:25

## 2021-01-05 NOTE — PROGRESS NOTE ADULT - ASSESSMENT
70  yr   wbc of  14,000/  mild  hyponatremia,  Hypoxia, on 6  L , O2 on arrival   elevtaed  lft's. h/o  Hep C +. ammonia of 53/ has been  above 100  in the past   pt alert,  on [id number]  mg.qd/ psychoses  on  abilify and  seroquel     Hypoxia , from , COVID pcr+ and  covid  pneumonia  CT  chest  angio,  no PE,  extensive  GOO  on , Remdisivir  day 5, completed   and Decadron  ID  dr langley  CRP 15/  mild  elevation in lft;s/ covid  Ab is  +  on lovenox  qd/  BMI is  24  follow  room air saturation       RA< from: CT Angio Chest w/ IV Cont (12.30.20 @ 23:27) >  IMPRESSION:  1. No central or lobar PE. Limited evaluation of segmental and subsegmental branches due to suboptimal opacification and motion artifact.  2. Extensive bilateral patchy and linear groundglass opacities with peripheral predominance, suspicious for Covid-19.  Correlation with PCR testing is recommended.  < end of copied text >.     70  yr   wbc of  14,000/  mild  hyponatremia,  Hypoxia, on 6  L , O2 on arrival   elevtaed  lft's. h/o  Hep C +. ammonia of 53/ has been  above 100  in the past   pt alert,  on [id number]  mg.qd/ psychoses  on  abilify and  seroquel     Hypoxia , from , COVID pcr+ and  covid  pneumonia  CT  chest  angio,  no PE,  extensive  GOO  on , Remdisivir  day 5, completed   and Decadron  ID  dr langley  CRP 15/  mild  elevation in lft;s/ covid  Ab is  +  on lovenox  qd/  BMI is  24  follow  room air saturation  spoke  with therapist . Giovanny,/ [employer]  [facility]/  wants   S/W  to call  him/ [phone]  plan., is home  tomorrow        RA< from: CT Angio Chest w/ IV Cont (12.30.20 @ 23:27) >  IMPRESSION:  1. No central or lobar PE. Limited evaluation of segmental and subsegmental branches due to suboptimal opacification and motion artifact.  2. Extensive bilateral patchy and linear groundglass opacities with peripheral predominance, suspicious for Covid-19.  Correlation with PCR testing is recommended.  < end of copied text >.     70  yr   wbc of  14,000/  mild  hyponatremia,  Hypoxia, on 6  L , O2 on arrival   elevtaed  lft's. h/o  Hep C +. ammonia of 53/ has been  above 100  in the past   pt alert,  on [id number]  mg.qd/ psychoses  on  abilify and  seroquel     Hypoxia , from , COVID pcr+ and  covid  pneumonia  CT  chest  angio,  no PE,  extensive  GOO  on , Remdisivir  day 5, completed   and Decadron  ID  dr langley  CRP 15/  mild  elevation in lft;s/ covid  Ab is  +  on lovenox  qd/  BMI is  24    follow  room air saturation/ and on ambulation / pt  doing well  spoke  with therapist . Giovanny,/ [facility]/  wants   S/W/care  cordinator ,    to call  him/ [phone]  plan., is home  tomorrow        RA< from: CT Angio Chest w/ IV Cont (12.30.20 @ 23:27) >  IMPRESSION:  1. No central or lobar PE. Limited evaluation of segmental and subsegmental branches due to suboptimal opacification and motion artifact.  2. Extensive bilateral patchy and linear groundglass opacities with peripheral predominance, suspicious for Covid-19.  Correlation with PCR testing is recommended.  < end of copied text >.

## 2021-01-05 NOTE — PROGRESS NOTE ADULT - ASSESSMENT
71 y/o male with PMHx of Heroin abuse on methadone, HTN, prediabetes, COPD  BPH, GERD, depression/anxiety, hepatitis C (s/p treatment), cervical radiculopathy, chronic low back pain, s/p Cardiac Cath last month (showed non-obstructive CAD) after c/o SOB exertion and having a +ve stress test, was brought to the hospital with shortness of breath, found to have O2 SAT 88% on RA, placed on NC 6 L, improved. The pt had one time low grade fever of 100.2, no fevers at home, WBC 14.35, normal renal function, UCx is negative, CTA negative for PE, positive for bilateral ground glass opacities, extensive. AST is elevated, ALT normal.   During examination, the pt is A&O x 3, no distress, on supplemental O2 NC 6 L- reduced to 4 L and doing well so far, RN notified. The pt denies having cough, no lack of smell or taste, no chest pain, no N/V, diarrhea, +constipation. The pt denies any urinary symptoms.   The pt was given one time doses of Rocephin and Zithromax in case he has CAP, was given one time dose of Decadron, COVID 19 testing is pending.   Pt's abd exam is benign, US abd pending.     1/4: pt is afebrile, WBC decreased to 13.17, on Decadron, with normal renal function, on supplemental O2 via NC, needs O2 SAT on RA, spoke with RN - will obtain.   1/5: pt is doing well off supplemental O2, 98% SAT on RA, completed the course of remdesivir, on Decadron, possible discharge home tomorrow    1. COVID 19 Pneumonia  2. Fever - improved  3. Leukocytosis - decreasing   4. Hypoxia    Plan  - continue Decadron x 10 days  - remdesivir x 5 days - complete   - supplemental O2 for O2 SAT > 92% if needed   - monitor pt's temperatures  - monitor WBC  - monitor pt's renal function daily  - monitor hepatic function daily   - precautions as per protocol  - anticoagulation as per protocol  - trend inflammatory markers  - discharge home tomorrow if stable

## 2021-01-05 NOTE — PROGRESS NOTE ADULT - SUBJECTIVE AND OBJECTIVE BOX
afberile/  on  oxygen  REVIEW OF SYSTEMS:  GEN: no fever,    no chills  RESP: no SOB,   no cough  CVS: no chest pain,   no palpitations  GI: no abdominal pain,   no nausea,   no vomiting,   no constipation,   no diarrhea  : no dysuria,   no frequency  NEURO: no headache,   no dizziness  PSYCH: no depression,   not anxious  Derm : no rash    MEDICATIONS  (STANDING):  amLODIPine   Tablet 5 milliGRAM(s) Oral daily  ARIPiprazole 5 milliGRAM(s) Oral daily  aspirin enteric coated 81 milliGRAM(s) Oral daily  dexAMETHasone  Injectable 6 milliGRAM(s) IV Push daily  enoxaparin Injectable 40 milliGRAM(s) SubCutaneous daily  finasteride 5 milliGRAM(s) Oral daily  hydrochlorothiazide 12.5 milliGRAM(s) Oral daily  lisinopril 40 milliGRAM(s) Oral daily  methadone    Tablet 40 milliGRAM(s) Oral daily  pantoprazole    Tablet 40 milliGRAM(s) Oral before breakfast  QUEtiapine 100 milliGRAM(s) Oral at bedtime  tamsulosin 0.8 milliGRAM(s) Oral at bedtime    MEDICATIONS  (PRN):  acetaminophen   Tablet .. 650 milliGRAM(s) Oral every 6 hours PRN Mild Pain (1 - 3)  ALBUTerol    90 MICROgram(s) HFA Inhaler 2 Puff(s) Inhalation every 6 hours PRN Shortness of Breath and/or Wheezing      Vital Signs Last 24 Hrs  T(C): 37 (05 Jan 2021 05:39), Max: 37 (05 Jan 2021 05:39)  T(F): 98.6 (05 Jan 2021 05:39), Max: 98.6 (05 Jan 2021 05:39)  HR: 68 (05 Jan 2021 05:39) (67 - 78)  BP: 102/66 (05 Jan 2021 05:39) (102/66 - 130/72)  BP(mean): --  RR: 18 (05 Jan 2021 05:39) (17 - 19)  SpO2: 94% (05 Jan 2021 05:39) (94% - 98%)  CAPILLARY BLOOD GLUCOSE        I&O's Summary    04 Jan 2021 07:01  -  05 Jan 2021 07:00  --------------------------------------------------------  IN: 0 mL / OUT: 200 mL / NET: -200 mL    05 Jan 2021 07:01  -  05 Jan 2021 10:03  --------------------------------------------------------  IN: 320 mL / OUT: 200 mL / NET: 120 mL        PHYSICAL EXAM:  HEAD:  Atraumatic, Normocephalic  NECK: Supple, No   JVD  CHEST/LUNG:   no     rales,     no,    rhonchi  HEART: Regular rate and rhythm;         murmur  ABDOMEN: Soft, Nontender, ;   EXTREMITIES:    no    edema  NEUROLOGY:  alert    LABS:                        13.2   12.53 )-----------( 442      ( 05 Jan 2021 07:21 )             39.2     01-05    137  |  103  |  19  ----------------------------<  84  3.9   |  30  |  0.76    Ca    8.5      05 Jan 2021 07:21    TPro  6.8  /  Alb  2.6<L>  /  TBili  0.3  /  DBili  x   /  AST  20  /  ALT  29  /  AlkPhos  66  01-05                            Consultant(s) Notes Reviewed:      Care Discussed with Consultants/Other Providers:       afberile/  off   oxygen, doing well  REVIEW OF SYSTEMS:  GEN: no fever,    no chills  RESP: no SOB,   no cough  CVS: no chest pain,   no palpitations  GI: no abdominal pain,   no nausea,   no vomiting,   no constipation,   no diarrhea  : no dysuria,   no frequency  NEURO: no headache,   no dizziness  PSYCH: no depression,   not anxious  Derm : no rash    MEDICATIONS  (STANDING):  amLODIPine   Tablet 5 milliGRAM(s) Oral daily  ARIPiprazole 5 milliGRAM(s) Oral daily  aspirin enteric coated 81 milliGRAM(s) Oral daily  dexAMETHasone  Injectable 6 milliGRAM(s) IV Push daily  enoxaparin Injectable 40 milliGRAM(s) SubCutaneous daily  finasteride 5 milliGRAM(s) Oral daily  hydrochlorothiazide 12.5 milliGRAM(s) Oral daily  lisinopril 40 milliGRAM(s) Oral daily  methadone    Tablet 40 milliGRAM(s) Oral daily  pantoprazole    Tablet 40 milliGRAM(s) Oral before breakfast  QUEtiapine 100 milliGRAM(s) Oral at bedtime  tamsulosin 0.8 milliGRAM(s) Oral at bedtime    MEDICATIONS  (PRN):  acetaminophen   Tablet .. 650 milliGRAM(s) Oral every 6 hours PRN Mild Pain (1 - 3)  ALBUTerol    90 MICROgram(s) HFA Inhaler 2 Puff(s) Inhalation every 6 hours PRN Shortness of Breath and/or Wheezing      Vital Signs Last 24 Hrs  T(C): 37 (05 Jan 2021 05:39), Max: 37 (05 Jan 2021 05:39)  T(F): 98.6 (05 Jan 2021 05:39), Max: 98.6 (05 Jan 2021 05:39)  HR: 68 (05 Jan 2021 05:39) (67 - 78)  BP: 102/66 (05 Jan 2021 05:39) (102/66 - 130/72)  BP(mean): --  RR: 18 (05 Jan 2021 05:39) (17 - 19)  SpO2: 94% (05 Jan 2021 05:39) (94% - 98%)  CAPILLARY BLOOD GLUCOSE        I&O's Summary    04 Jan 2021 07:01  -  05 Jan 2021 07:00  --------------------------------------------------------  IN: 0 mL / OUT: 200 mL / NET: -200 mL    05 Jan 2021 07:01  -  05 Jan 2021 10:03  --------------------------------------------------------  IN: 320 mL / OUT: 200 mL / NET: 120 mL        PHYSICAL EXAM:  HEAD:  Atraumatic, Normocephalic  NECK: Supple, No   JVD  CHEST/LUNG:   no     rales,     no,    rhonchi  HEART: Regular rate and rhythm;         murmur  ABDOMEN: Soft, Nontender, ;   EXTREMITIES:    no    edema  NEUROLOGY:  alert    LABS:                        13.2   12.53 )-----------( 442      ( 05 Jan 2021 07:21 )             39.2     01-05    137  |  103  |  19  ----------------------------<  84  3.9   |  30  |  0.76    Ca    8.5      05 Jan 2021 07:21    TPro  6.8  /  Alb  2.6<L>  /  TBili  0.3  /  DBili  x   /  AST  20  /  ALT  29  /  AlkPhos  66  01-05                            Consultant(s) Notes Reviewed:      Care Discussed with Consultants/Other Providers:

## 2021-01-05 NOTE — PROGRESS NOTE ADULT - SUBJECTIVE AND OBJECTIVE BOX
MARGY HINOJOSA  MRN-[id number]    Follow Up:  COVID 19     Interval History: The pt is out of bed to chair, on RA with O2 SAT 98%, no complains. The pt is afebrile, WBC decreased to 12.53, with  normal renal and hepatic function, s/p completion of the course of remdesivir, on Decadron.     ROS:    [ ] Unobtainable because:  [ ] All other systems negative    Constitutional: no fever, no chills  Head: no trauma  Eyes: no vision changes, no eye pain  ENT:  no sore throat, no rhinorrhea  Cardiovascular:  no chest pain, no palpitation  Respiratory:  no SOB, no cough  GI:  no abd pain, no vomiting, no diarrhea  urinary: no dysuria, no hematuria, no flank pain  musculoskeletal:  no joint pain, no joint swelling  skin:  no rash  neurology:  no headache, no seizure, no change in mental status  psych: no anxiety, no depression         Allergies  No Known Allergies        ANTIMICROBIALS:      OTHER MEDS:  acetaminophen   Tablet .. 650 milliGRAM(s) Oral every 6 hours PRN  ALBUTerol    90 MICROgram(s) HFA Inhaler 2 Puff(s) Inhalation every 6 hours PRN  amLODIPine   Tablet 5 milliGRAM(s) Oral daily  ARIPiprazole 5 milliGRAM(s) Oral daily  aspirin enteric coated 81 milliGRAM(s) Oral daily  dexAMETHasone  Injectable 6 milliGRAM(s) IV Push daily  enoxaparin Injectable 40 milliGRAM(s) SubCutaneous daily  finasteride 5 milliGRAM(s) Oral daily  hydrochlorothiazide 12.5 milliGRAM(s) Oral daily  lisinopril 40 milliGRAM(s) Oral daily  methadone    Tablet 40 milliGRAM(s) Oral daily  pantoprazole    Tablet 40 milliGRAM(s) Oral before breakfast  QUEtiapine 100 milliGRAM(s) Oral at bedtime  tamsulosin 0.8 milliGRAM(s) Oral at bedtime      Vital Signs Last 24 Hrs  T(C): 36.8 (05 Jan 2021 10:55), Max: 37 (05 Jan 2021 05:39)  T(F): 98.2 (05 Jan 2021 10:55), Max: 98.6 (05 Jan 2021 05:39)  HR: 93 (05 Jan 2021 10:55) (67 - 93)  BP: 125/73 (05 Jan 2021 10:55) (102/66 - 130/72)  BP(mean): --  RR: 18 (05 Jan 2021 10:55) (17 - 18)  SpO2: 93% (05 Jan 2021 10:55) (93% - 96%)    Physical Exam:  General: Nontoxic-appearing Male in no acute distress. On RA  HEENT: AT/NC. PERRL. EOMI. Anicteric. Conjunctiva pink and moist. Oropharynx clear. Missing teeth   Neck: Not rigid. No sense of mass.  Nodes: None palpable.  Lungs: bilateral fine crackles - improved, without rales, wheezing or rhonchi  Heart: Regular rate and rhythm. No Murmur. No rub. No gallop. No palpable thrill.  Abdomen: Bowel sounds present and normoactive. Soft. Nondistended. Nontender. No sense of mass. No organomegaly.  Back: No spinal tenderness. No costovertebral angle tenderness.   Extremities: No cyanosis or clubbing. No edema.   Skin: Warm. Dry. Good turgor. No rash. No vasculitic stigmata. PIV ok  Psychiatric: Appropriate affect and mood for situation.     WBC Count: 12.53 K/uL (01-05 @ 07:21)  WBC Count: 13.17 K/uL (01-04 @ 06:41)  WBC Count: 14.35 K/uL (12-31 @ 04:40)  WBC Count: 7.11 K/uL (12-30 @ 14:40)                            13.2   12.53 )-----------( 442      ( 05 Jan 2021 07:21 )             39.2       01-05    137  |  103  |  19  ----------------------------<  84  3.9   |  30  |  0.76    Ca    8.5      05 Jan 2021 07:21    TPro  6.8  /  Alb  2.6<L>  /  TBili  0.3  /  DBili  x   /  AST  20  /  ALT  29  /  AlkPhos  66  01-05          Creatinine Trend: 0.76<--, 0.72<--, 0.70<--, 0.77<--, 0.95<--      MICROBIOLOGY:  v  .Urine Clean Catch (Midstream)  12-30-20   <10,000 CFU/mL Normal Urogenital Renee  --  --              COVID-19 PCR: Detected (12-30)    C-Reactive Protein, Serum: 15.86 (01-01)    Ferritin, Serum: 189 (01-01)      D-Dimer Assay, Quantitative: 418 (01-05)  D-Dimer Assay, Quantitative: 260 (01-01)        RADIOLOGY:

## 2021-01-06 ENCOUNTER — TRANSCRIPTION ENCOUNTER (OUTPATIENT)
Age: 71
End: 2021-01-06

## 2021-01-06 VITALS
OXYGEN SATURATION: 98 % | HEART RATE: 89 BPM | SYSTOLIC BLOOD PRESSURE: 126 MMHG | DIASTOLIC BLOOD PRESSURE: 70 MMHG | RESPIRATION RATE: 17 BRPM | TEMPERATURE: 98 F

## 2021-01-06 LAB — PROCALCITONIN SERPL-MCNC: 0.14 NG/ML — HIGH (ref 0.02–0.1)

## 2021-01-06 PROCEDURE — 99232 SBSQ HOSP IP/OBS MODERATE 35: CPT

## 2021-01-06 PROCEDURE — 99239 HOSP IP/OBS DSCHRG MGMT >30: CPT

## 2021-01-06 RX ORDER — RIVAROXABAN 15 MG-20MG
10 KIT ORAL DAILY
Refills: 0 | Status: DISCONTINUED | OUTPATIENT
Start: 2021-01-06 | End: 2021-01-06

## 2021-01-06 RX ORDER — RIVAROXABAN 15 MG-20MG
1 KIT ORAL
Qty: 21 | Refills: 0
Start: 2021-01-06

## 2021-01-06 RX ADMIN — AMLODIPINE BESYLATE 5 MILLIGRAM(S): 2.5 TABLET ORAL at 06:19

## 2021-01-06 RX ADMIN — LISINOPRIL 40 MILLIGRAM(S): 2.5 TABLET ORAL at 06:19

## 2021-01-06 RX ADMIN — RIVAROXABAN 10 MILLIGRAM(S): KIT at 13:07

## 2021-01-06 RX ADMIN — ARIPIPRAZOLE 5 MILLIGRAM(S): 15 TABLET ORAL at 13:06

## 2021-01-06 RX ADMIN — Medication 6 MILLIGRAM(S): at 06:19

## 2021-01-06 RX ADMIN — Medication 81 MILLIGRAM(S): at 13:06

## 2021-01-06 RX ADMIN — Medication 12.5 MILLIGRAM(S): at 06:19

## 2021-01-06 RX ADMIN — FINASTERIDE 5 MILLIGRAM(S): 5 TABLET, FILM COATED ORAL at 13:07

## 2021-01-06 RX ADMIN — PANTOPRAZOLE SODIUM 40 MILLIGRAM(S): 20 TABLET, DELAYED RELEASE ORAL at 06:19

## 2021-01-06 RX ADMIN — METHADONE HYDROCHLORIDE 40 MILLIGRAM(S): 40 TABLET ORAL at 13:07

## 2021-01-06 NOTE — DISCHARGE NOTE PROVIDER - HOSPITAL COURSE
71 y/o male with PMHx of Heroin abuse on methadone, HTN, prediabetes, COPD  BPH, GERD, depression/anxiety, hepatitis C (s/p treatment), cervical radiculopathy, chronic low back pain, s/p Cardiac Cath last month (showed non-obstructive CAD), presented to [facility] with shortness of breath, found to have O2 SAT 88% on RA, placed on NC 6 L, improved. The pt had one time low grade fever of 100.2, no fevers at home, WBC 14.35, normal renal function, UCx is negative, CTA negative for PE, positive for bilateral ground glass opacities, extensive. AST is elevated, ALT normal.  COVID positive, s/p Remdesivir and Decadron.      # COVID 19 infection - seen my ID, s/p Remdesivir, Decadron.  Discussed monitoring for clinical worsening with pt, daughter.  Monitor SaO2 at home.  Discussed self quarantine per CDC guidelines.  He is nearly asymptomatic at present on RA.  Reviewed risks, benefits and alternatives to AC with pt, daughter.  They were in agreement.  Rx oral anticoagulation on discharge.    # Heroin Abuse - continue methadone.   # Essential HTN - Monitor BP.  Continue oral antihypertensives.  # Depression, Anxiety - mood stable.  Continue Quetiapine.  # Essential HTN - Monitor BP.  Continue oral antihypertensives.  # BPH - continue Flomax.  # DVT Proph - see above.    Discharge d/w daughter Bishnu at [phone] in agreement.  Pt's daughters have made arrangements for homecare on discharge.

## 2021-01-06 NOTE — DISCHARGE NOTE PROVIDER - NSDCMRMEDTOKEN_GEN_ALL_CORE_FT
Abilify 5 mg oral tablet: 1 tab(s) orally once a day (at bedtime)  albuterol 90 mcg/inh inhalation aerosol: 2 puff(s) inhaled every 6 hours, As Needed  Amitiza 24 mcg oral capsule: 1 cap(s) orally 2 times a day  amLODIPine 5 mg oral tablet: 1 tab(s) orally once a day  Aricept 23 mg oral tablet: 1 tab(s) orally once a day (at bedtime)  Aspirin Enteric Coated 81 mg oral delayed release tablet: 1 tab(s) orally once a day  finasteride 5 mg oral tablet: 1 tab(s) orally once a day (at bedtime)  Flomax 0.4 mg oral capsule: 2 cap(s) orally once a day (at bedtime)  hydroCHLOROthiazide 12.5 mg oral capsule: 1 cap(s) orally once a day  magnesium oxide 400 mg (241.3 mg elemental magnesium) oral tablet: 1 tab(s) orally 2 times a day  methadone 10 mg oral tablet: 4 tab(s) orally once a day  pantoprazole 20 mg oral delayed release tablet: 1 tab(s) orally once a day  ramipril 10 mg oral capsule: 1 cap(s) orally 2 times a day  rivaroxaban 10 mg oral tablet: 1 tab(s) orally once a day  SEROquel 100 mg oral tablet: 1 tab(s) orally once a day (at bedtime)  Symbicort 160 mcg-4.5 mcg/inh inhalation aerosol: 2 puff(s) inhaled 2 times a day

## 2021-01-06 NOTE — PROGRESS NOTE ADULT - ASSESSMENT
71 y/o male with PMHx of Heroin abuse on methadone, HTN, prediabetes, COPD  BPH, GERD, depression/anxiety, hepatitis C (s/p treatment), cervical radiculopathy, chronic low back pain, s/p Cardiac Cath last month (showed non-obstructive CAD) after c/o SOB exertion and having a +ve stress test, was brought to the hospital with shortness of breath, found to have O2 SAT 88% on RA, placed on NC 6 L, improved. The pt had one time low grade fever of 100.2, no fevers at home, WBC 14.35, normal renal function, UCx is negative, CTA negative for PE, positive for bilateral ground glass opacities, extensive. AST is elevated, ALT normal.   During examination, the pt is A&O x 3, no distress, on supplemental O2 NC 6 L- reduced to 4 L and doing well so far, RN notified. The pt denies having cough, no lack of smell or taste, no chest pain, no N/V, diarrhea, +constipation. The pt denies any urinary symptoms.   The pt was given one time doses of Rocephin and Zithromax in case he has CAP, was given one time dose of Decadron, COVID 19 testing is pending.   Pt's abd exam is benign, US abd pending.     1/4: pt is afebrile, WBC decreased to 13.17, on Decadron, with normal renal function, on supplemental O2 via NC, needs O2 SAT on RA, spoke with RN - will obtain.   1/5: pt is doing well off supplemental O2, 98% SAT on RA, completed the course of remdesivir, on Decadron, possible discharge home tomorrow  1/6: pt continued doing well, afebrile, does not require supplemental O2, will be going home today     1. COVID 19 Pneumonia  2. Fever - improved  3. Leukocytosis - decreasing   4. Hypoxia - improved     Plan  - continue Decadron x 10 days or until discharge   - remdesivir x 5 days - complete   - supplemental O2 for O2 SAT > 92% if needed   - monitor pt's temperatures  - monitor WBC  - monitor pt's renal function daily  - monitor hepatic function daily   - precautions as per protocol  - anticoagulation as per protocol  - trend inflammatory markers  - discharge home today, maintain quarantine at home for 10 days post COVID 19 diagnosis  71 y/o male with PMHx of Heroin abuse on methadone, HTN, prediabetes, COPD  BPH, GERD, depression/anxiety, hepatitis C (s/p treatment), cervical radiculopathy, chronic low back pain, s/p Cardiac Cath last month (showed non-obstructive CAD) after c/o SOB exertion and having a +ve stress test, was brought to the hospital with shortness of breath, found to have O2 SAT 88% on RA, placed on NC 6 L, improved. The pt had one time low grade fever of 100.2, no fevers at home, WBC 14.35, normal renal function, UCx is negative, CTA negative for PE, positive for bilateral ground glass opacities, extensive. AST is elevated, ALT normal.   During examination, the pt is A&O x 3, no distress, on supplemental O2 NC 6 L- reduced to 4 L and doing well so far, RN notified. The pt denies having cough, no lack of smell or taste, no chest pain, no N/V, diarrhea, +constipation. The pt denies any urinary symptoms.   The pt was given one time doses of Rocephin and Zithromax in case he has CAP, was given one time dose of Decadron, COVID 19 testing is pending.   Pt's abd exam is benign, US abd pending.     1/4: pt is afebrile, WBC decreased to 13.17, on Decadron, with normal renal function, on supplemental O2 via NC, needs O2 SAT on RA, spoke with RN - will obtain.   1/5: pt is doing well off supplemental O2, 98% SAT on RA, completed the course of remdesivir, on Decadron, possible discharge home tomorrow  1/6: pt continued doing well, afebrile, does not require supplemental O2, will be going home today     1. COVID 19 Pneumonia  2. Fever - improved  3. Leukocytosis - decreasing   4. Hypoxia - improved     Attending Addendum--  Case reviewed with NP Cookie Mosquera. Her note reviewed and modified as appropriate.   Patient personally assessed and examined.  Seen earlier today.  Doing well. Comfortable on RA without complaints.  Lung exam clear  No concern of uncontrolled infection on the basis of history or exam  Reviewed need for isolation/quarantine with him in Layman's terms    Plan  - continue Decadron x 10 days or until discharge   - remdesivir x 5 days - complete   - supplemental O2 for O2 SAT > 92% if needed   - monitor pt's temperatures  - monitor WBC  - monitor pt's renal function daily  - monitor hepatic function daily   - precautions as per protocol  - anticoagulation as per protocol  - trend inflammatory markers  - discharge home today, maintain quarantine at home for 10 days post COVID 19 diagnosis     Tadeo Real MD  Attending Physician  [facility]  Division of Infectious Diseases  [phone]

## 2021-01-06 NOTE — PROGRESS NOTE ADULT - SUBJECTIVE AND OBJECTIVE BOX
Patient: MARGY HINOJOSA [id number] 70y Male                           Internal Medicine Hospitalist Progress Note    No complaints.  No fever / chills.   No SOB.  Off O2.     ____________________PHYSICAL EXAM:  GENERAL:  NAD alert & oriented x 3.   HEENT: NCAT  CARDIOVASCULAR:  S1, S2  LUNGS: CTAB  ABDOMEN:  soft, (-) tenderness, (-) distension, (+) bowel sounds, (-) guarding, (-) rebound (-) rigidity  EXTREMITIES:  no cyanosis / clubbing / edema.   ____________________     VITALS:  Vital Signs Last 24 Hrs  T(C): 37.1 (2021 11:20), Max: 37.1 (2021 11:20)  T(F): 98.8 (2021 11:20), Max: 98.8 (2021 11:20)  HR: 86 (2021 11:20) (56 - 86)  BP: 114/54 (2021 11:20) (114/54 - 133/70)  BP(mean): --  RR: 17 (2021 11:20) (17 - 18)  SpO2: 95% (2021 11:20) (92% - 97%) Daily     Daily Weight in k.6 (2021 06:00)  CAPILLARY BLOOD GLUCOSE        I&O's Summary    2021 07:01  -  2021 07:00  --------------------------------------------------------  IN: 880 mL / OUT: 900 mL / NET: -20 mL          BACKGROUND:  HEALTH ISSUES - PROBLEM Dx:  DVT prophylaxis  DVT prophylaxis    Benign prostatic hyperplasia without lower urinary tract symptoms  Benign prostatic hyperplasia without lower urinary tract symptoms    Heroin abuse  Heroin abuse    Essential hypertension  Essential hypertension    Pneumonia due to infectious organism, unspecified laterality, unspecified part of lung  Pneumonia due to infectious organism, unspecified laterality, unspecified part of lung          Allergies    No Known Allergies    Intolerances      PAST MEDICAL & SURGICAL HISTORY:  Depression    H/O gastroesophageal reflux (GERD)    Prediabetes    BPH (benign prostatic hyperplasia)    Hypertension    No significant past surgical history          LABS:                        13.2   12.53 )-----------( 442      ( 2021 07:21 )             39.2         137  |  103  |  19  ----------------------------<  84  3.9   |  30  |  0.76    Ca    8.5      2021 07:21    TPro  6.8  /  Alb  2.6<L>  /  TBili  0.3  /  DBili  x   /  AST  20  /  ALT  29  /  AlkPhos  66  01-05      LIVER FUNCTIONS - ( 2021 07:21 )  Alb: 2.6 g/dL / Pro: 6.8 gm/dL / ALK PHOS: 66 U/L / ALT: 29 U/L / AST: 20 U/L / GGT: x                     MEDICATIONS:  MEDICATIONS  (STANDING):  amLODIPine   Tablet 5 milliGRAM(s) Oral daily  ARIPiprazole 5 milliGRAM(s) Oral daily  aspirin enteric coated 81 milliGRAM(s) Oral daily  dexAMETHasone  Injectable 6 milliGRAM(s) IV Push daily  finasteride 5 milliGRAM(s) Oral daily  hydrochlorothiazide 12.5 milliGRAM(s) Oral daily  lisinopril 40 milliGRAM(s) Oral daily  methadone    Tablet 40 milliGRAM(s) Oral daily  pantoprazole    Tablet 40 milliGRAM(s) Oral before breakfast  QUEtiapine 100 milliGRAM(s) Oral at bedtime  rivaroxaban 10 milliGRAM(s) Oral daily  tamsulosin 0.8 milliGRAM(s) Oral at bedtime    MEDICATIONS  (PRN):  acetaminophen   Tablet .. 650 milliGRAM(s) Oral every 6 hours PRN Mild Pain (1 - 3)  ALBUTerol    90 MICROgram(s) HFA Inhaler 2 Puff(s) Inhalation every 6 hours PRN Shortness of Breath and/or Wheezing

## 2021-01-06 NOTE — DISCHARGE NOTE PROVIDER - NSDCFUADDINST_GEN_ALL_CORE_FT
It is important to see your primary physician as well as the physicians noted below within the next week to perform a comprehensive medical review.  Call their offices for an appointment as soon as you leave the hospital.  If you do not have a primary physician, contact the [facility] Physician Referral Service at [phone].  To obtain your results, you can access the [facility] Patient Portal at [email or web address].  Your medical issues appear to be stable at this time, but if your symptoms recur or worsen, contact your physicians and/or return to the hospital if necessary.  If you encounter any issues or questions with your medication, call your physicians before stopping the medication.  Do not drive.  Limit your diet to 2 grams of sodium daily.

## 2021-01-06 NOTE — PROGRESS NOTE ADULT - ASSESSMENT
71 y/o male with PMHx of Heroin abuse on methadone, HTN, prediabetes, COPD  BPH, GERD, depression/anxiety, hepatitis C (s/p treatment), cervical radiculopathy, chronic low back pain, s/p Cardiac Cath last month (showed non-obstructive CAD), presented to [facility] with shortness of breath, found to have O2 SAT 88% on RA, placed on NC 6 L, improved. The pt had one time low grade fever of 100.2, no fevers at home, WBC 14.35, normal renal function, UCx is negative, CTA negative for PE, positive for bilateral ground glass opacities, extensive. AST is elevated, ALT normal.  COVID positive, s/p Remdesivir and Decadron.      # COVID 19 infection - seen my ID, s/p Remdesivir, Decadron.  Discussed monitoring for clinical worsening with pt, daughter.  Monitor SaO2 at home.  Discussed self quarantine per CDC guidelines.  He is nearly asymptomatic at present on RA.  Reviewed risks, benefits and alternatives to AC with pt, daughter.  They were in agreement.  Rx oral anticoagulation on discharge.    # Heroin Abuse - continue methadone.   # Essential HTN - Monitor BP.  Continue oral antihypertensives.  # Depression, Anxiety - mood stable.  Continue Quetiapine.  # Essential HTN - Monitor BP.  Continue oral antihypertensives.  # BPH - continue Flomax.  # DVT Proph - see above.    Discharge d/w daughter Bishnu at [phone] in agreement.  Pt's daughters have made arrangements for homecare on discharge.

## 2021-01-06 NOTE — PROGRESS NOTE ADULT - PROVIDER SPECIALTY LIST ADULT
Infectious Disease
Internal Medicine
Infectious Disease
Internal Medicine
Internal Medicine
Infectious Disease
Hospitalist

## 2021-01-06 NOTE — PROGRESS NOTE ADULT - SUBJECTIVE AND OBJECTIVE BOX
MARGY HINOJOSA  MRN-[id number]    Follow Up:  COVID 19    Interval History: the pt is sitting on his bed, no distress, on room air, no new complains, doing well. The pt is afebrile, no new cbc today, eager to go home.     ROS:    [ ] Unobtainable because:  [ ] All other systems negative    Constitutional: no fever, no chills  Head: no trauma  Eyes: no vision changes, no eye pain  ENT:  no sore throat, no rhinorrhea  Cardiovascular:  no chest pain, no palpitation  Respiratory:  no SOB, no cough  GI:  no abd pain, no vomiting, no diarrhea  urinary: no dysuria, no hematuria, no flank pain  musculoskeletal:  no joint pain, no joint swelling  skin:  no rash  neurology:  no headache, no seizure, no change in mental status  psych: no anxiety, no depression         Allergies  No Known Allergies        ANTIMICROBIALS:      OTHER MEDS:  acetaminophen   Tablet .. 650 milliGRAM(s) Oral every 6 hours PRN  ALBUTerol    90 MICROgram(s) HFA Inhaler 2 Puff(s) Inhalation every 6 hours PRN  amLODIPine   Tablet 5 milliGRAM(s) Oral daily  ARIPiprazole 5 milliGRAM(s) Oral daily  aspirin enteric coated 81 milliGRAM(s) Oral daily  dexAMETHasone  Injectable 6 milliGRAM(s) IV Push daily  finasteride 5 milliGRAM(s) Oral daily  hydrochlorothiazide 12.5 milliGRAM(s) Oral daily  lisinopril 40 milliGRAM(s) Oral daily  methadone    Tablet 40 milliGRAM(s) Oral daily  pantoprazole    Tablet 40 milliGRAM(s) Oral before breakfast  QUEtiapine 100 milliGRAM(s) Oral at bedtime  rivaroxaban 10 milliGRAM(s) Oral daily  tamsulosin 0.8 milliGRAM(s) Oral at bedtime      Vital Signs Last 24 Hrs  T(C): 37.1 (06 Jan 2021 11:20), Max: 37.1 (06 Jan 2021 11:20)  T(F): 98.8 (06 Jan 2021 11:20), Max: 98.8 (06 Jan 2021 11:20)  HR: 86 (06 Jan 2021 11:20) (56 - 86)  BP: 114/54 (06 Jan 2021 11:20) (114/54 - 133/70)  BP(mean): --  RR: 17 (06 Jan 2021 11:20) (17 - 18)  SpO2: 95% (06 Jan 2021 11:20) (92% - 97%)    Physical Exam:  General: Nontoxic-appearing Male in no acute distress. On RA  HEENT: AT/NC. PERRL. EOMI. Anicteric. Conjunctiva pink and moist. Oropharynx clear. Missing teeth   Neck: Not rigid. No sense of mass.  Nodes: None palpable.  Lungs: very fine crackles bilaterally at the bases, without rales, wheezing or rhonchi  Heart: Regular rate and rhythm. No Murmur. No rub. No gallop. No palpable thrill.  Abdomen: Bowel sounds present and normoactive. Soft. Nondistended. Nontender. No sense of mass. No organomegaly.  Back: No spinal tenderness. No costovertebral angle tenderness.   Extremities: No cyanosis or clubbing. No edema.   Skin: Warm. Dry. Good turgor. No rash. No vasculitic stigmata. PIV ok  Psychiatric: Appropriate affect and mood for situation.     WBC Count: 12.53 K/uL (01-05 @ 07:21)  WBC Count: 13.17 K/uL (01-04 @ 06:41)  WBC Count: 14.35 K/uL (12-31 @ 04:40)  WBC Count: 7.11 K/uL (12-30 @ 14:40)                            13.2   12.53 )-----------( 442      ( 05 Jan 2021 07:21 )             39.2       01-05    137  |  103  |  19  ----------------------------<  84  3.9   |  30  |  0.76    Ca    8.5      05 Jan 2021 07:21    TPro  6.8  /  Alb  2.6<L>  /  TBili  0.3  /  DBili  x   /  AST  20  /  ALT  29  /  AlkPhos  66  01-05          Creatinine Trend: 0.76<--, 0.72<--, 0.70<--, 0.77<--, 0.95<--      MICROBIOLOGY:  v  .Urine Clean Catch (Midstream)  12-30-20   <10,000 CFU/mL Normal Urogenital Renee  --  --              COVID-19 PCR: Detected (12-30)    C-Reactive Protein, Serum: 1.36 (01-05)  C-Reactive Protein, Serum: 15.86 (01-01)    Ferritin, Serum: 97 (01-05)  Ferritin, Serum: 189 (01-01)      D-Dimer Assay, Quantitative: 418 (01-05)  D-Dimer Assay, Quantitative: 260 (01-01)        RADIOLOGY:

## 2021-01-06 NOTE — DISCHARGE NOTE PROVIDER - NSDCCPCAREPLAN_GEN_ALL_CORE_FT
PRINCIPAL DISCHARGE DIAGNOSIS  Diagnosis: COVID-19  Assessment and Plan of Treatment:       SECONDARY DISCHARGE DIAGNOSES  Diagnosis: Heroin abuse  Assessment and Plan of Treatment: Heroin abuse    Diagnosis: Essential hypertension  Assessment and Plan of Treatment: Essential hypertension    Diagnosis: Benign prostatic hyperplasia without lower urinary tract symptoms  Assessment and Plan of Treatment: Benign prostatic hyperplasia without lower urinary tract symptoms    Diagnosis: Benign prostatic hyperplasia without lower urinary tract symptoms  Assessment and Plan of Treatment: Benign prostatic hyperplasia without lower urinary tract symptoms

## 2021-01-06 NOTE — DISCHARGE NOTE NURSING/CASE MANAGEMENT/SOCIAL WORK - PATIENT PORTAL LINK FT
You can access the FollowMyHealth Patient Portal offered by [facility] by registering at the following website: [email or web address]. By joining [employer]’s FollowMyHealth portal, you will also be able to view your health information using other applications (apps) compatible with our system.

## 2021-01-07 ENCOUNTER — TRANSCRIPTION ENCOUNTER (OUTPATIENT)
Age: 71
End: 2021-01-07
